# Patient Record
Sex: MALE | Race: WHITE | NOT HISPANIC OR LATINO | Employment: OTHER | URBAN - METROPOLITAN AREA
[De-identification: names, ages, dates, MRNs, and addresses within clinical notes are randomized per-mention and may not be internally consistent; named-entity substitution may affect disease eponyms.]

---

## 2019-03-12 ENCOUNTER — HOSPITAL ENCOUNTER (OUTPATIENT)
Dept: LAB | Facility: MEDICAL CENTER | Age: 54
End: 2019-03-12
Attending: NURSE PRACTITIONER
Payer: COMMERCIAL

## 2019-03-12 ENCOUNTER — OFFICE VISIT (OUTPATIENT)
Dept: MEDICAL GROUP | Facility: PHYSICIAN GROUP | Age: 54
End: 2019-03-12
Payer: COMMERCIAL

## 2019-03-12 VITALS
TEMPERATURE: 98 F | HEIGHT: 65 IN | WEIGHT: 206.8 LBS | RESPIRATION RATE: 18 BRPM | HEART RATE: 82 BPM | OXYGEN SATURATION: 95 % | DIASTOLIC BLOOD PRESSURE: 88 MMHG | SYSTOLIC BLOOD PRESSURE: 122 MMHG | BODY MASS INDEX: 34.45 KG/M2

## 2019-03-12 DIAGNOSIS — N40.1 BENIGN PROSTATIC HYPERPLASIA WITH URINARY FREQUENCY: ICD-10-CM

## 2019-03-12 DIAGNOSIS — H81.10 BENIGN PAROXYSMAL POSITIONAL VERTIGO, UNSPECIFIED LATERALITY: ICD-10-CM

## 2019-03-12 DIAGNOSIS — Z12.11 SCREENING FOR COLORECTAL CANCER: ICD-10-CM

## 2019-03-12 DIAGNOSIS — Z12.5 SCREENING FOR PROSTATE CANCER: ICD-10-CM

## 2019-03-12 DIAGNOSIS — Z00.00 ANNUAL PHYSICAL EXAM: ICD-10-CM

## 2019-03-12 DIAGNOSIS — R35.0 BENIGN PROSTATIC HYPERPLASIA WITH URINARY FREQUENCY: ICD-10-CM

## 2019-03-12 DIAGNOSIS — Z12.12 SCREENING FOR COLORECTAL CANCER: ICD-10-CM

## 2019-03-12 PROBLEM — R42 VERTIGO: Status: ACTIVE | Noted: 2019-03-12

## 2019-03-12 PROBLEM — R39.13 INTERMITTENT URINARY STREAM: Status: ACTIVE | Noted: 2019-03-12

## 2019-03-12 LAB
ALBUMIN SERPL BCP-MCNC: 4.4 G/DL (ref 3.2–4.9)
ALBUMIN/GLOB SERPL: 1.5 G/DL
ALP SERPL-CCNC: 44 U/L (ref 30–99)
ALT SERPL-CCNC: 60 U/L (ref 2–50)
ANION GAP SERPL CALC-SCNC: 8 MMOL/L (ref 0–11.9)
AST SERPL-CCNC: 28 U/L (ref 12–45)
BASOPHILS # BLD AUTO: 1 % (ref 0–1.8)
BASOPHILS # BLD: 0.09 K/UL (ref 0–0.12)
BILIRUB SERPL-MCNC: 0.7 MG/DL (ref 0.1–1.5)
BUN SERPL-MCNC: 24 MG/DL (ref 8–22)
CALCIUM SERPL-MCNC: 9.2 MG/DL (ref 8.5–10.5)
CHLORIDE SERPL-SCNC: 104 MMOL/L (ref 96–112)
CHOLEST SERPL-MCNC: 249 MG/DL (ref 100–199)
CO2 SERPL-SCNC: 28 MMOL/L (ref 20–33)
CREAT SERPL-MCNC: 0.95 MG/DL (ref 0.5–1.4)
EOSINOPHIL # BLD AUTO: 0.13 K/UL (ref 0–0.51)
EOSINOPHIL NFR BLD: 1.5 % (ref 0–6.9)
ERYTHROCYTE [DISTWIDTH] IN BLOOD BY AUTOMATED COUNT: 45.8 FL (ref 35.9–50)
EST. AVERAGE GLUCOSE BLD GHB EST-MCNC: 126 MG/DL
FASTING STATUS PATIENT QL REPORTED: NORMAL
GLOBULIN SER CALC-MCNC: 2.9 G/DL (ref 1.9–3.5)
GLUCOSE SERPL-MCNC: 98 MG/DL (ref 65–99)
HBA1C MFR BLD: 6 % (ref 0–5.6)
HCT VFR BLD AUTO: 48.5 % (ref 42–52)
HDLC SERPL-MCNC: 43 MG/DL
HGB BLD-MCNC: 15.9 G/DL (ref 14–18)
IMM GRANULOCYTES # BLD AUTO: 0.07 K/UL (ref 0–0.11)
IMM GRANULOCYTES NFR BLD AUTO: 0.8 % (ref 0–0.9)
LDLC SERPL CALC-MCNC: 132 MG/DL
LYMPHOCYTES # BLD AUTO: 2.46 K/UL (ref 1–4.8)
LYMPHOCYTES NFR BLD: 28.5 % (ref 22–41)
MCH RBC QN AUTO: 31.1 PG (ref 27–33)
MCHC RBC AUTO-ENTMCNC: 32.8 G/DL (ref 33.7–35.3)
MCV RBC AUTO: 94.9 FL (ref 81.4–97.8)
MONOCYTES # BLD AUTO: 0.68 K/UL (ref 0–0.85)
MONOCYTES NFR BLD AUTO: 7.9 % (ref 0–13.4)
NEUTROPHILS # BLD AUTO: 5.19 K/UL (ref 1.82–7.42)
NEUTROPHILS NFR BLD: 60.3 % (ref 44–72)
NRBC # BLD AUTO: 0 K/UL
NRBC BLD-RTO: 0 /100 WBC
PLATELET # BLD AUTO: 190 K/UL (ref 164–446)
PMV BLD AUTO: 11.2 FL (ref 9–12.9)
POTASSIUM SERPL-SCNC: 4 MMOL/L (ref 3.6–5.5)
PROT SERPL-MCNC: 7.3 G/DL (ref 6–8.2)
PSA SERPL-MCNC: 1.4 NG/ML (ref 0–4)
RBC # BLD AUTO: 5.11 M/UL (ref 4.7–6.1)
SODIUM SERPL-SCNC: 140 MMOL/L (ref 135–145)
TRIGL SERPL-MCNC: 371 MG/DL (ref 0–149)
TSH SERPL DL<=0.005 MIU/L-ACNC: 1.14 UIU/ML (ref 0.38–5.33)
WBC # BLD AUTO: 8.6 K/UL (ref 4.8–10.8)

## 2019-03-12 PROCEDURE — 36415 COLL VENOUS BLD VENIPUNCTURE: CPT

## 2019-03-12 PROCEDURE — 83036 HEMOGLOBIN GLYCOSYLATED A1C: CPT

## 2019-03-12 PROCEDURE — 84443 ASSAY THYROID STIM HORMONE: CPT

## 2019-03-12 PROCEDURE — 84153 ASSAY OF PSA TOTAL: CPT

## 2019-03-12 PROCEDURE — 85025 COMPLETE CBC W/AUTO DIFF WBC: CPT

## 2019-03-12 PROCEDURE — 80061 LIPID PANEL: CPT

## 2019-03-12 PROCEDURE — 80053 COMPREHEN METABOLIC PANEL: CPT

## 2019-03-12 PROCEDURE — 99204 OFFICE O/P NEW MOD 45 MIN: CPT | Performed by: NURSE PRACTITIONER

## 2019-03-12 RX ORDER — MECLIZINE HYDROCHLORIDE 25 MG/1
25 TABLET ORAL 3 TIMES DAILY PRN
Qty: 60 TAB | Refills: 0 | Status: SHIPPED | OUTPATIENT
Start: 2019-03-12 | End: 2019-06-17

## 2019-03-12 ASSESSMENT — ENCOUNTER SYMPTOMS
BLOOD IN STOOL: 0
BLURRED VISION: 0
FOCAL WEAKNESS: 0
SENSORY CHANGE: 0
ABDOMINAL PAIN: 0
SHORTNESS OF BREATH: 0
DIZZINESS: 1
CHILLS: 0
SPEECH CHANGE: 0
HEADACHES: 0
FEVER: 0

## 2019-03-12 ASSESSMENT — PATIENT HEALTH QUESTIONNAIRE - PHQ9: CLINICAL INTERPRETATION OF PHQ2 SCORE: 0

## 2019-03-12 NOTE — ASSESSMENT & PLAN NOTE
This is a new problem. Onset began over one year ago. He reports urgency, frequency, and intermittent urinary stream. He denies constipation. He denies FH of prostate cancer.

## 2019-03-12 NOTE — PROGRESS NOTES
"New Patient appointment    Shamika Downing is a 53 y.o. male here to establish care. His previous PCP was none. His presents with the following concerns:    HPI:      Vertigo  Reports new onset of vertigo that began 6 weeks ago. Symptoms include spinning sensation. He denies hearing loss or pain. He was evaluated at [facility] who prescribed PT and dramamine. Reports that he attempted maneuver at home and feels \"worse.\" He did one day of PT which he noticed no difference.    Intermittent urinary stream  This is a new problem. Onset began over one year ago. He reports urgency, frequency, and intermittent urinary stream. He denies constipation. He denies FH of prostate cancer.    Current medicines (including changes today)  Current Outpatient Prescriptions   Medication Sig Dispense Refill   • meclizine (ANTIVERT) 25 MG Tab Take 1 Tab by mouth 3 times a day as needed. 60 Tab 0     No current facility-administered medications for this visit.      He  has a past medical history of History of stroke.  He  has no past surgical history on file.  Social History   Substance Use Topics   • Smoking status: Never Smoker   • Smokeless tobacco: Never Used   • Alcohol use Yes      Comment: occasionally     Social History     Social History Narrative   • No narrative on file     Family History   Problem Relation Age of Onset   • Other Mother         Cirrhosis   • No Known Problems Father    • Cancer Sister 53        cervical   • No Known Problems Maternal Grandmother    • No Known Problems Maternal Grandfather    • No Known Problems Paternal Grandmother    • No Known Problems Paternal Grandfather    • No Known Problems Daughter    • No Known Problems Daughter      No family status information on file.         Review of Systems   Constitutional: Negative for chills, fever and malaise/fatigue.   HENT: Negative for hearing loss.    Eyes: Negative for blurred vision.   Respiratory: Negative for shortness of breath.    Cardiovascular: " "Negative for chest pain.   Gastrointestinal: Negative for abdominal pain and blood in stool.   Genitourinary: Positive for frequency and urgency. Negative for hematuria.   Neurological: Positive for dizziness. Negative for sensory change, speech change, focal weakness and headaches.       All other systems reviewed and are negative    Depression Screening    Little interest or pleasure in doing things?  0 - not at all  Feeling down, depressed , or hopeless? 0 - not at all  Patient Health Questionnaire Score: 0    If depressive symptoms identified deferred to follow up visit unless specifically addressed in assesment and plan.      Interpretation of PHQ-9 Total Score   Score Severity   1-4 Minimal Depression   5-9 Mild Depression   10-14 Moderate Depression   15-19 Moderately Severe Depression   20-27 Severe Depression       Objective:     Blood pressure 122/88, pulse 82, temperature 36.7 °C (98 °F), temperature source Temporal, resp. rate 18, height 1.651 m (5' 5\"), weight 93.8 kg (206 lb 12.8 oz), SpO2 95 %. Body mass index is 34.41 kg/m².    Physical Exam:  Constitutional: Oriented to person, place, and time and well-developed, well-nourished, and in no distress.   HENT:   Head: Normocephalic and atraumatic.   Right Ear: Tympanic membrane and external ear normal.   Left Ear: Tympanic membrane and external ear normal.   Mouth/Throat: Oropharynx is clear and moist and mucous membranes are normal. No oropharyngeal exudate or posterior oropharyngeal erythema.   Eyes: Conjunctivae and EOM are normal. Pupils are equal, round, and reactive to light.   Neck: Normal range of motion. Neck supple. No thyromegaly present.   Cardiovascular: Normal rate, regular rhythm, normal heart sounds. Radialpulses intact. Exam reveals no friction rub. No murmur heard.  Pulmonary/Chest: Effort normal and breath sounds normal. No respiratory distress or use of accessory muscles. No wheezes, rhonchi, or rales.   Abdominal: Soft. Bowel sounds " are normal. Exhibits no distension and no mass. There is no tenderness. No hepatosplenomegaly.    Musculoskeletal: Full range of motion. No deformity or swelling of joints. DTRs intact.   Lymphadenopathy:     No cervical adenopathy.   Neurological: Alert and oriented to person, place, and time. Gait normal.   Skin: Skin is warm and dry. No cyanosis. No edema.  Psychiatric: Mood, memory, affect and judgment normal.     Assessment and Plan:   The following treatment plan was discussed    1. Benign paroxysmal positional vertigo, unspecified laterality  Stable, improving. Continue PT and meclizine as prescribed. Encouraged daily water intake of at least 2 L daily. We discussed referral to ENT if symptoms do not improve.  - meclizine (ANTIVERT) 25 MG Tab; Take 1 Tab by mouth 3 times a day as needed.  Dispense: 60 Tab; Refill: 0  - REFERRAL TO PHYSICAL THERAPY Reason for Therapy: Eval/Treat/Report    2. Benign prostatic hyperplasia with urinary frequency  I suspect BPH, however I cannot r/o malignancy. PSA ordered. I did suggest OCT saw palmetto to help with symptoms.    3. Annual physical exam  Routine screening labs ordered.  - Comp Metabolic Panel; Future  - HEMOGLOBIN A1C; Future  - CBC WITH DIFFERENTIAL; Future  - Lipid Profile; Future  - TSH WITH REFLEX TO FT4; Future    4. Screening for colorectal cancer  - OCCULT BLOOD FECES IMMUNOASSAY (FIT); Future    5. Screening for prostate cancer  - PROSTATE SPECIFIC AG SCREENING; Future    Records requested.    Followup: Return if symptoms worsen or fail to improve.    I have reviewed and agree with history, assessment and plan for office encounter on 3/12/19 with Advanced Practice Provider: Irma SOL.  Face to face encounter/direct observation: No  Suggested changes to plan or follow-up: recommend she be seen by ENT   Crispin Woods M.D.          [phone]

## 2019-03-12 NOTE — ASSESSMENT & PLAN NOTE
"Reports new onset of vertigo that began 6 weeks ago. Symptoms include spinning sensation. He denies hearing loss or pain. He was evaluated at [facility] who prescribed PT and dramamine. Reports that he attempted maneuver at home and feels \"worse.\" He did one day of PT which he noticed no difference.  "

## 2019-03-13 ENCOUNTER — SUPERVISING PHYSICIAN REVIEW (OUTPATIENT)
Dept: MEDICAL GROUP | Facility: PHYSICIAN GROUP | Age: 54
End: 2019-03-13

## 2019-03-13 ENCOUNTER — HOSPITAL ENCOUNTER (OUTPATIENT)
Facility: MEDICAL CENTER | Age: 54
End: 2019-03-13
Attending: NURSE PRACTITIONER
Payer: COMMERCIAL

## 2019-03-13 ENCOUNTER — TELEPHONE (OUTPATIENT)
Dept: MEDICAL GROUP | Facility: PHYSICIAN GROUP | Age: 54
End: 2019-03-13

## 2019-03-13 PROCEDURE — 82274 ASSAY TEST FOR BLOOD FECAL: CPT

## 2019-03-13 NOTE — TELEPHONE ENCOUNTER
----- Message from VINICIO Teresa sent at 3/13/2019 10:18 AM PDT -----  Please advise patient I have reviewed their recent lab results. There are some results that need further evaluation. Please have patient schedule an office visit. Thank you!    VINICIO Teresa,SYL

## 2019-03-20 LAB — HEMOCCULT STL QL IA: NEGATIVE

## 2019-04-08 ENCOUNTER — OFFICE VISIT (OUTPATIENT)
Dept: MEDICAL GROUP | Facility: PHYSICIAN GROUP | Age: 54
End: 2019-04-08
Payer: COMMERCIAL

## 2019-04-08 VITALS
BODY MASS INDEX: 34.77 KG/M2 | DIASTOLIC BLOOD PRESSURE: 78 MMHG | TEMPERATURE: 97.1 F | HEART RATE: 87 BPM | RESPIRATION RATE: 19 BRPM | HEIGHT: 65 IN | SYSTOLIC BLOOD PRESSURE: 120 MMHG | WEIGHT: 208.7 LBS | OXYGEN SATURATION: 94 %

## 2019-04-08 DIAGNOSIS — R73.03 PREDIABETES: ICD-10-CM

## 2019-04-08 DIAGNOSIS — E78.2 MIXED HYPERLIPIDEMIA: ICD-10-CM

## 2019-04-08 DIAGNOSIS — R42 VERTIGO: ICD-10-CM

## 2019-04-08 PROBLEM — E78.5 HYPERLIPIDEMIA: Status: ACTIVE | Noted: 2019-04-08

## 2019-04-08 PROCEDURE — 99214 OFFICE O/P EST MOD 30 MIN: CPT | Performed by: NURSE PRACTITIONER

## 2019-04-08 RX ORDER — SIMVASTATIN 40 MG
40 TABLET ORAL EVERY EVENING
Qty: 90 TAB | Refills: 0 | Status: SHIPPED | OUTPATIENT
Start: 2019-04-08 | End: 2019-10-03 | Stop reason: SDUPTHER

## 2019-04-08 NOTE — ASSESSMENT & PLAN NOTE
Lab results show elevated total cholesterol, triglycerides, and LDL. HA1C was also elevated at 6.0. He does admit to sedentary lifestyle and overall unhealthy diet. He admits to consuming red meats, nicholson, and cheese on a regular basis. He does have hx of stroke. He is a nonsmoker.     Results for GREG RUIZ (MRN [id number]) as of 4/9/2019 12:46   Ref. Range 3/12/2019 11:31   Cholesterol,Tot Latest Ref Range: 100 - 199 mg/dL 249 (H)   Triglycerides Latest Ref Range: 0 - 149 mg/dL 371 (H)   HDL Latest Ref Range: >=40 mg/dL 43   LDL Latest Ref Range: <100 mg/dL 132 (H)

## 2019-04-08 NOTE — PROGRESS NOTES
"Subjective:   Shamika Ruiz is a 53 y.o. male here today for follow up on labs and vertigo.    Hyperlipidemia  Lab results show elevated total cholesterol, triglycerides, and LDL. HA1C was also elevated at 6.0. He does admit to sedentary lifestyle and overall unhealthy diet. He admits to consuming red meats, nicholson, and cheese on a regular basis. He does have hx of stroke. He is a nonsmoker.     Results for SHAMIKA RUIZ (MRN [id number]) as of 4/9/2019 12:46   Ref. Range 3/12/2019 11:31   Cholesterol,Tot Latest Ref Range: 100 - 199 mg/dL 249 (H)   Triglycerides Latest Ref Range: 0 - 149 mg/dL 371 (H)   HDL Latest Ref Range: >=40 mg/dL 43   LDL Latest Ref Range: <100 mg/dL 132 (H)         Vertigo  Onset began over 6 weeks ago. He was referred to vestibular PT and prescribed meclizine, however his symptoms have not improved. He continues to experiencing \"spinning sensation.\" He denies hearing loss or ear pain.       Current medicines (including changes today)  Current Outpatient Prescriptions   Medication Sig Dispense Refill   • simvastatin (ZOCOR) 40 MG Tab Take 1 Tab by mouth every evening. 90 Tab 0   • meclizine (ANTIVERT) 25 MG Tab Take 1 Tab by mouth 3 times a day as needed. 60 Tab 0     No current facility-administered medications for this visit.      He  has a past medical history of History of stroke.    Social History     Social History   • Marital status:      Spouse name: N/A   • Number of children: N/A   • Years of education: N/A     Occupational History   • Not on file.     Social History Main Topics   • Smoking status: Never Smoker   • Smokeless tobacco: Never Used   • Alcohol use Yes      Comment: occasionally   • Drug use: No   • Sexual activity: Yes     Partners: Female      Comment:      Other Topics Concern   • Not on file     Social History Narrative   • No narrative on file       Review of Systems   Constitutional: Negative for chills, fever and malaise/fatigue.   HENT: Negative for ear " "pain, hearing loss and tinnitus.    Respiratory: Negative for shortness of breath.    Cardiovascular: Negative for chest pain and palpitations.   Neurological: Positive for dizziness. Negative for headaches.        Objective:     /78   Pulse 87   Temp 36.2 °C (97.1 °F) (Temporal)   Resp 19   Ht 1.651 m (5' 5\")   Wt 94.7 kg (208 lb 11.2 oz)   SpO2 94%  Body mass index is 34.73 kg/m².     Physical Exam:  Constitutional: Oriented to person, place, and time and well-developed, well-nourished, and in no distress.   HENT:   Head: Normocephalic and atraumatic.   Eyes: Conjunctivae and EOM are normal. Pupils are equal, round, and reactive to light.   Neck: Normal range of motion. Neck supple.  Cardiovascular: Normal rate, regular rhythm, normal heart sounds.  Exam reveals no friction rub. No murmur heard.  Pulmonary/Chest: Effort normal and breath sounds normal. No respiratory distress or use of accessory muscles. No wheezes, rhonchi, or rales.   Neurological: Alert and oriented to person, place, and time. Gait normal.   Skin: Skin is warm and dry. No cyanosis. No edema.  Psychiatric: Mood, memory, affect and judgment normal.       Assessment and Plan:   The following treatment plan was discussed    1. Mixed hyperlipidemia  Uncontrolled. Due to elevated cholesterol levels and previous history of stroke, initiate treatment with simvastatin. Strongly encourage lifestyle modifications with low saturated fat and high fiber diet. I also recommend OTC Co-Q 10 supplement of 100-200 mg daily to prevent side effects of statin therapy. Will recheck lipid panel and LFTs in 3 months.  - simvastatin (ZOCOR) 40 MG Tab; Take 1 Tab by mouth every evening.  Dispense: 90 Tab; Refill: 0  - HEMOGLOBIN A1C; Future  - Comp Metabolic Panel; Future  - Lipid Profile; Future    2. Vertigo  Uncontrolled. He was referred to ENT for further evaluation.  - REFERRAL TO ENT    3. Prediabetes  Strongly encourage lifestyle changes with low " intake of refined carbohydrates, along with daily physical activity.  - HEMOGLOBIN A1C; Future      Followup: Return in about 3 months (around 7/8/2019) for For follow-up on hyperlipidemia.

## 2019-04-09 PROBLEM — R73.03 PREDIABETES: Status: ACTIVE | Noted: 2019-04-09

## 2019-04-09 ASSESSMENT — ENCOUNTER SYMPTOMS
FEVER: 0
SHORTNESS OF BREATH: 0
HEADACHES: 0
DIZZINESS: 1
PALPITATIONS: 0
CHILLS: 0

## 2019-04-09 NOTE — ASSESSMENT & PLAN NOTE
"Onset began over 6 weeks ago. He was referred to vestibular PT and prescribed meclizine, however his symptoms have not improved. He continues to experiencing \"spinning sensation.\" He denies hearing loss or ear pain.  "

## 2019-06-17 ENCOUNTER — OFFICE VISIT (OUTPATIENT)
Dept: MEDICAL GROUP | Facility: PHYSICIAN GROUP | Age: 54
End: 2019-06-17
Payer: COMMERCIAL

## 2019-06-17 VITALS
SYSTOLIC BLOOD PRESSURE: 122 MMHG | RESPIRATION RATE: 18 BRPM | OXYGEN SATURATION: 95 % | TEMPERATURE: 97.6 F | DIASTOLIC BLOOD PRESSURE: 82 MMHG | HEIGHT: 65 IN | HEART RATE: 100 BPM | BODY MASS INDEX: 34.42 KG/M2 | WEIGHT: 206.57 LBS

## 2019-06-17 DIAGNOSIS — L24.89 IRRITANT CONTACT DERMATITIS DUE TO OTHER AGENTS: ICD-10-CM

## 2019-06-17 DIAGNOSIS — D22.9 ATYPICAL NEVUS: ICD-10-CM
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
## 2019-06-17 PROBLEM — L98.9 SKIN LESION: Status: ACTIVE | Noted: 2019-06-17

## 2019-06-17 PROCEDURE — 99214 OFFICE O/P EST MOD 30 MIN: CPT | Performed by: NURSE PRACTITIONER

## 2019-06-17 RX ORDER — HYDROXYZINE 50 MG/1
50 TABLET, FILM COATED ORAL 3 TIMES DAILY PRN
Qty: 30 TAB | Refills: 0 | Status: SHIPPED | OUTPATIENT
Start: 2019-06-17 | End: 2022-04-06

## 2019-06-17 ASSESSMENT — ENCOUNTER SYMPTOMS
CHILLS: 0
FEVER: 0

## 2019-06-17 NOTE — PROGRESS NOTES
"Subjective:   Shamika Downing is a 53 y.o. male here today for c/o skin lesion.    No problem-specific Assessment & Plan notes found for this encounter.     Rash       Current medicines (including changes today)  Current Outpatient Prescriptions   Medication Sig Dispense Refill   • simvastatin (ZOCOR) 40 MG Tab Take 1 Tab by mouth every evening. 90 Tab 0   • meclizine (ANTIVERT) 25 MG Tab Take 1 Tab by mouth 3 times a day as needed. 60 Tab 0     No current facility-administered medications for this visit.      He  has a past medical history of History of stroke.    Social History     Social History   • Marital status:      Spouse name: N/A   • Number of children: N/A   • Years of education: N/A     Occupational History   • Not on file.     Social History Main Topics   • Smoking status: Never Smoker   • Smokeless tobacco: Never Used   • Alcohol use Yes      Comment: occasionally   • Drug use: No   • Sexual activity: Yes     Partners: Female      Comment:      Other Topics Concern   • Not on file     Social History Narrative   • No narrative on file       Review of Systems   Skin: Positive for rash.          Objective:     /82   Pulse 100   Temp 36.4 °C (97.6 °F) (Temporal)   Resp 18   Ht 1.651 m (5' 5\")   Wt 93.7 kg (206 lb 9.1 oz)   SpO2 95%  Body mass index is 34.38 kg/m². ***    Physical Exam:  Constitutional: Oriented to person, place, and time and well-developed, well-nourished, and in no distress.   HENT:   Head: Normocephalic and atraumatic.   Right Ear: Tympanic membrane and external ear normal.   Left Ear: Tympanic membrane and external ear normal.   Mouth/Throat: Oropharynx is clear and moist and mucous membranes are normal. No oropharyngeal exudate or posterior oropharyngeal erythema.   Eyes: Conjunctivae and EOM are normal. Pupils are equal, round, and reactive to light.   Neck: Normal range of motion. Neck supple. No thyromegaly present.   Cardiovascular: Normal rate, regular " rhythm, normal heart sounds. Radial and pedal pulses intact. Exam reveals no friction rub. No murmur heard.  Pulmonary/Chest: Effort normal and breath sounds normal. No respiratory distress or use of accessory muscles. No wheezes, rhonchi, or rales.   Abdominal: Soft. Bowel sounds are normal. Exhibits no distension and no mass. There is no tenderness. No hepatosplenomegaly.    Musculoskeletal: Full range of motion. No deformity or swelling of joints.   Lymphadenopathy:     No cervical adenopathy.   Neurological: Alert and oriented to person, place, and time. Gait normal. CN I- CN XII intact. Coordination of rapid alternating movements. Cerebellar function intact. DTR +2, symmetrical.   Skin: Skin is warm and dry. No cyanosis. No edema.  Psychiatric: Mood, memory, affect and judgment normal.   ***      Assessment and Plan:   The following treatment plan was discussed    There are no diagnoses linked to this encounter.    Followup: No Follow-up on file.

## 2019-06-17 NOTE — ASSESSMENT & PLAN NOTE
New onset of skin lesion present for past 2 months. Lesion has grown in size and tender to touch. He admits to not typically wearing sunscreen with sun exposure, however he does wear long sleeves and hat while fishing. Reports FH of skin cancer (paternal uncle).

## 2019-06-17 NOTE — PROGRESS NOTES
"Subjective:   Shamika Downing is a 53 y.o. male here today for c/o rash and skin lesion.    Skin lesion  New onset of skin lesion present for past 2 months. Lesion has grown in size and tender to touch. He admits to not typically wearing sunscreen with sun exposure, however he does wear long sleeves and hat while fishing. Reports FH of skin cancer (paternal uncle).        Rash   This is a recurrent problem. The problem has been waxing and waning since onset. The affected locations include the left lower leg and right lower leg. The rash is characterized by itchiness. He was exposed to an insect bite/sting (Mosquito bites). Pertinent negatives include no fever. Past treatments include antihistamine and anti-itch cream. The treatment provided mild relief.       Current medicines (including changes today)  Current Outpatient Prescriptions   Medication Sig Dispense Refill   • hydrOXYzine HCl (ATARAX) 50 MG Tab Take 1 Tab by mouth 3 times a day as needed for Itching. 30 Tab 0   • simvastatin (ZOCOR) 40 MG Tab Take 1 Tab by mouth every evening. 90 Tab 0     No current facility-administered medications for this visit.      He  has a past medical history of History of stroke.    Social History     Social History   • Marital status:      Spouse name: N/A   • Number of children: N/A   • Years of education: N/A     Occupational History   • Not on file.     Social History Main Topics   • Smoking status: Never Smoker   • Smokeless tobacco: Never Used   • Alcohol use Yes      Comment: occasionally   • Drug use: No   • Sexual activity: Yes     Partners: Female      Comment:      Other Topics Concern   • Not on file     Social History Narrative   • No narrative on file       Review of Systems   Constitutional: Negative for chills and fever.   Skin: Positive for itching and rash.          Objective:     /82   Pulse 100   Temp 36.4 °C (97.6 °F) (Temporal)   Resp 18   Ht 1.651 m (5' 5\")   Wt 93.7 kg (206 lb 9.1 " oz)   SpO2 95%  Body mass index is 34.38 kg/m².     Physical Exam:  Constitutional: Oriented to person, place, and time and well-developed, well-nourished, and in no distress.   HENT:   Head: Normocephalic and atraumatic.   Eyes: Conjunctivae and EOM are normal. Pupils are equal, round, and reactive to light.   Neck: Normal range of motion. Neck supple.  Cardiovascular: Normal rate, regular rhythm, normal heart sounds.Exam reveals no friction rub. No murmur heard.  Pulmonary/Chest: Effort normal and breath sounds normal. No respiratory distress or use of accessory muscles. No wheezes, rhonchi, or rales.   Neurological: Alert and oriented to person, place, and time. Gait normal.   Skin: Skin is warm and dry. No cyanosis. Approx 0.3 cm nevus located right forehead, flesh colored. Multiple healing lesions that appear as insect bites to BLE.  Psychiatric: Mood, memory, affect and judgment normal.       Assessment and Plan:   The following treatment plan was discussed    1. Atypical nevus  He was referred to dermatology for further evaluation.  - REFERRAL TO DERMATOLOGY    2. Irritant contact dermatitis due to other agents  I recommend use of insect repellant and protective clothing to prevent further insect exposure. I suggested natural skin repellant with peppermint, eucalyptus, and lemon essential oils. He was prescribed hydroxyzine prn for itching. Advised to also apply calamine lotion or benadryl cream to lesions.  - hydrOXYzine HCl (ATARAX) 50 MG Tab; Take 1 Tab by mouth 3 times a day as needed for Itching.  Dispense: 30 Tab; Refill: 0      Followup: Return if symptoms worsen or fail to improve.

## 2019-07-22 DIAGNOSIS — G47.33 OSA (OBSTRUCTIVE SLEEP APNEA): ICD-10-CM

## 2019-10-03 DIAGNOSIS — E78.2 MIXED HYPERLIPIDEMIA: ICD-10-CM

## 2019-10-03 RX ORDER — SIMVASTATIN 40 MG
40 TABLET ORAL EVERY EVENING
Qty: 90 TAB | Refills: 0 | Status: SHIPPED | OUTPATIENT
Start: 2019-10-03 | End: 2019-10-07 | Stop reason: SDUPTHER

## 2019-10-07 DIAGNOSIS — E78.2 MIXED HYPERLIPIDEMIA: ICD-10-CM

## 2019-10-07 RX ORDER — SIMVASTATIN 40 MG
40 TABLET ORAL EVERY EVENING
Qty: 90 TAB | Refills: 0 | Status: SHIPPED | OUTPATIENT
Start: 2019-10-07 | End: 2020-08-03 | Stop reason: SDUPTHER

## 2019-12-04 ENCOUNTER — OFFICE VISIT (OUTPATIENT)
Dept: MEDICAL GROUP | Facility: PHYSICIAN GROUP | Age: 54
End: 2019-12-04
Payer: COMMERCIAL

## 2019-12-04 VITALS
TEMPERATURE: 98.3 F | OXYGEN SATURATION: 98 % | HEIGHT: 65 IN | SYSTOLIC BLOOD PRESSURE: 120 MMHG | WEIGHT: 206 LBS | BODY MASS INDEX: 34.32 KG/M2 | HEART RATE: 95 BPM | RESPIRATION RATE: 20 BRPM | DIASTOLIC BLOOD PRESSURE: 90 MMHG

## 2019-12-04 DIAGNOSIS — L30.9 DERMATITIS: ICD-10-CM

## 2019-12-04 PROBLEM — L98.9 SKIN LESION: Status: RESOLVED | Noted: 2019-06-17 | Resolved: 2019-12-04

## 2019-12-04 PROCEDURE — 99214 OFFICE O/P EST MOD 30 MIN: CPT | Performed by: NURSE PRACTITIONER

## 2019-12-04 RX ORDER — TRIAMCINOLONE ACETONIDE 1 MG/G
CREAM TOPICAL
Qty: 1 TUBE | Refills: 1 | Status: SHIPPED | OUTPATIENT
Start: 2019-12-04 | End: 2019-12-27 | Stop reason: SDUPTHER

## 2019-12-04 ASSESSMENT — ENCOUNTER SYMPTOMS
FEVER: 0
CHILLS: 0

## 2019-12-04 NOTE — PROGRESS NOTES
Subjective:   Shamika Downing is a 54 y.o. male here today for concerns of rash.    Rash   This is a new problem. Episode onset: August of this year. The problem is unchanged. The affected locations include the left ankle. The rash is characterized by itchiness, scaling and redness. He was exposed to nothing. Pertinent negatives include no fever or joint pain. Past treatments include moisturizer. The treatment provided no relief. There is no history of allergies or eczema.     Current medicines (including changes today)  Current Outpatient Medications   Medication Sig Dispense Refill   • triamcinolone acetonide (KENALOG) 0.1 % Cream Apply pea size amount to affected area twice daily for 7 days. May repeat if symptoms do not resolved. 1 Tube 1   • simvastatin (ZOCOR) 40 MG Tab Take 1 Tab by mouth every evening. 90 Tab 0   • hydrOXYzine HCl (ATARAX) 50 MG Tab Take 1 Tab by mouth 3 times a day as needed for Itching. (Patient not taking: Reported on 12/4/2019) 30 Tab 0     No current facility-administered medications for this visit.      He  has a past medical history of History of stroke.    Social History     Socioeconomic History   • Marital status:      Spouse name: Not on file   • Number of children: Not on file   • Years of education: Not on file   • Highest education level: Not on file   Occupational History   • Not on file   Social Needs   • Financial resource strain: Not on file   • Food insecurity:     Worry: Not on file     Inability: Not on file   • Transportation needs:     Medical: Not on file     Non-medical: Not on file   Tobacco Use   • Smoking status: Never Smoker   • Smokeless tobacco: Never Used   Substance and Sexual Activity   • Alcohol use: Yes     Comment: occasionally   • Drug use: No   • Sexual activity: Yes     Partners: Female     Comment:    Lifestyle   • Physical activity:     Days per week: Not on file     Minutes per session: Not on file   • Stress: Not on file   Relationships  "  • Social connections:     Talks on phone: Not on file     Gets together: Not on file     Attends Hinduism service: Not on file     Active member of club or organization: Not on file     Attends meetings of clubs or organizations: Not on file     Relationship status: Not on file   • Intimate partner violence:     Fear of current or ex partner: Not on file     Emotionally abused: Not on file     Physically abused: Not on file     Forced sexual activity: Not on file   Other Topics Concern   • Not on file   Social History Narrative   • Not on file       Review of Systems   Constitutional: Negative for chills, fever and malaise/fatigue.   Musculoskeletal: Negative for joint pain.   Skin: Positive for itching and rash.        Objective:     /90   Pulse 95   Temp 36.8 °C (98.3 °F) (Temporal)   Resp 20   Ht 1.651 m (5' 5\")   Wt 93.4 kg (206 lb)   SpO2 98%  Body mass index is 34.28 kg/m².     Physical Exam:  Constitutional: Oriented to person, place, and time and well-developed, well-nourished, and in no distress.   HENT:   Head: Normocephalic and atraumatic.   Eyes: Conjunctivae and EOM are normal. Pupils are equal, round, and reactive to light.   Neck: Normal range of motion. Neck supple.  Cardiovascular: Normal rate, regular rhythm, normal heart sounds. Exam reveals no friction rub. No murmur heard.  Pulmonary/Chest: Effort normal and breath sounds normal. No respiratory distress or use of accessory muscles. No wheezes, rhonchi, or rales.   Neurological: Alert and oriented to person, place, and time.   Skin: Skin is warm and dry. No cyanosis. No edema.  Physical Exam   Skin:        Erythematous plaque located L lateral ankle. No drainage or vesicles noted.     Psychiatric: Mood, memory, affect and judgment normal.       Assessment and Plan:   The following treatment plan was discussed    1. Dermatitis  I suspect eczema, however I cannot r/o psoriasis or other skin etiology. Will treat with topical steroid. " Advised to apply emollient cream such as aquaphor or coconut oil daily. He was referred to dermatology for further evaluation.  - REFERRAL TO DERMATOLOGY  - triamcinolone acetonide (KENALOG) 0.1 % Cream; Apply pea size amount to affected area twice daily for 7 days. May repeat if symptoms do not resolved.  Dispense: 1 Tube; Refill: 1      Followup: Return if symptoms worsen or fail to improve.

## 2020-08-03 DIAGNOSIS — E78.2 MIXED HYPERLIPIDEMIA: ICD-10-CM

## 2020-08-04 RX ORDER — SIMVASTATIN 40 MG
40 TABLET ORAL EVERY EVENING
Qty: 90 TAB | Refills: 0 | Status: SHIPPED | OUTPATIENT
Start: 2020-08-04 | End: 2022-04-06 | Stop reason: SDUPTHER

## 2022-04-06 ENCOUNTER — NON-PROVIDER VISIT (OUTPATIENT)
Dept: URGENT CARE | Facility: CLINIC | Age: 57
End: 2022-04-06
Payer: COMMERCIAL

## 2022-04-06 ENCOUNTER — OFFICE VISIT (OUTPATIENT)
Dept: MEDICAL GROUP | Facility: PHYSICIAN GROUP | Age: 57
End: 2022-04-06
Payer: COMMERCIAL

## 2022-04-06 ENCOUNTER — APPOINTMENT (OUTPATIENT)
Dept: RADIOLOGY | Facility: IMAGING CENTER | Age: 57
End: 2022-04-06
Attending: STUDENT IN AN ORGANIZED HEALTH CARE EDUCATION/TRAINING PROGRAM
Payer: COMMERCIAL

## 2022-04-06 VITALS
WEIGHT: 217.15 LBS | HEART RATE: 100 BPM | SYSTOLIC BLOOD PRESSURE: 126 MMHG | TEMPERATURE: 97.5 F | OXYGEN SATURATION: 93 % | RESPIRATION RATE: 12 BRPM | BODY MASS INDEX: 34.9 KG/M2 | HEIGHT: 66 IN | DIASTOLIC BLOOD PRESSURE: 70 MMHG

## 2022-04-06 DIAGNOSIS — M25.512 CHRONIC PAIN OF BOTH SHOULDERS: ICD-10-CM

## 2022-04-06 DIAGNOSIS — G89.29 CHRONIC PAIN OF BOTH SHOULDERS: ICD-10-CM

## 2022-04-06 DIAGNOSIS — E66.9 OBESITY (BMI 30-39.9): ICD-10-CM

## 2022-04-06 DIAGNOSIS — R39.13 SPLITTING OF URINARY STREAM: ICD-10-CM

## 2022-04-06 DIAGNOSIS — M25.511 CHRONIC PAIN OF BOTH SHOULDERS: ICD-10-CM

## 2022-04-06 DIAGNOSIS — R73.03 PREDIABETES: ICD-10-CM

## 2022-04-06 DIAGNOSIS — E78.2 MIXED HYPERLIPIDEMIA: ICD-10-CM

## 2022-04-06 DIAGNOSIS — Z00.00 HEALTHCARE MAINTENANCE: ICD-10-CM

## 2022-04-06 DIAGNOSIS — Z23 NEED FOR VACCINATION: ICD-10-CM

## 2022-04-06 PROCEDURE — 90471 IMMUNIZATION ADMIN: CPT | Performed by: STUDENT IN AN ORGANIZED HEALTH CARE EDUCATION/TRAINING PROGRAM

## 2022-04-06 PROCEDURE — 90715 TDAP VACCINE 7 YRS/> IM: CPT | Performed by: STUDENT IN AN ORGANIZED HEALTH CARE EDUCATION/TRAINING PROGRAM

## 2022-04-06 PROCEDURE — 99214 OFFICE O/P EST MOD 30 MIN: CPT | Mod: 25 | Performed by: STUDENT IN AN ORGANIZED HEALTH CARE EDUCATION/TRAINING PROGRAM

## 2022-04-06 PROCEDURE — 73030 X-RAY EXAM OF SHOULDER: CPT | Mod: TC,LT | Performed by: RADIOLOGY

## 2022-04-06 PROCEDURE — 73030 X-RAY EXAM OF SHOULDER: CPT | Mod: TC,RT | Performed by: STUDENT IN AN ORGANIZED HEALTH CARE EDUCATION/TRAINING PROGRAM

## 2022-04-06 RX ORDER — SIMVASTATIN 40 MG
40 TABLET ORAL EVERY EVENING
Qty: 90 TABLET | Refills: 3 | Status: SHIPPED | OUTPATIENT
Start: 2022-04-06 | End: 2022-04-06

## 2022-04-06 RX ORDER — SIMVASTATIN 40 MG
40 TABLET ORAL EVERY EVENING
Qty: 90 EACH | Refills: 3 | Status: SHIPPED | OUTPATIENT
Start: 2022-04-06 | End: 2023-03-09

## 2022-04-06 ASSESSMENT — ENCOUNTER SYMPTOMS
SPUTUM PRODUCTION: 0
SHORTNESS OF BREATH: 0
DIZZINESS: 0
FEVER: 0
HEADACHES: 0
WHEEZING: 0
COUGH: 0

## 2022-04-06 ASSESSMENT — PATIENT HEALTH QUESTIONNAIRE - PHQ9: CLINICAL INTERPRETATION OF PHQ2 SCORE: 0

## 2022-04-06 NOTE — ASSESSMENT & PLAN NOTE
On exam today he is positive for a provocative test of the rotator cuff muscles as well as positive Ransom's test concerning for labrum pathology.    Plan:  X-rays, physical therapy  Return to care in 1 month if not improving consider MRI of both shoulders or steroid injections    This is a new problem for me, 2 x-rays ordered, physical therapy

## 2022-04-06 NOTE — PROGRESS NOTES
Subjective:   HISTORY OF THE PRESENT ILLNESS: Patient is a 56 y.o. male here today to establish care and discuss shoulder pain bilaterally.    Problem   Shoulder Pain, Bilateral    Patient reports he fell down some stairs about 6 months ago and since then has been having progressively worsening shoulder pain and limited range of motion.  The pain is worse at night he feels it inside the joints.  He is noticed he can cross his arms or reach around to his back as he normally could.  He denies any fevers, chills, numbness and tingling down his arms.  Denies any neck pain     Obesity (Bmi 30-39.9)   Healthcare Maintenance   Prediabetes   Hyperlipidemia    Patient ran out of his simvastatin and needs refills.  His last lipid panel was done about 3 years ago.  He denies any chest pain, dyspnea on exertion, edema.     Intermittent urinary stream    Patient complaining of chronic weak urinary stream and increased frequency and urgency of urination.  He denies any significant nocturia and wakes up about once a night to urinate.  His last PSA was done as 19 within normal limits.        Current Outpatient Medications Ordered in Epic   Medication Sig Dispense Refill   • simvastatin (ZOCOR) 40 MG Tab Take 1 Tablet by mouth every evening. 90 Each 3     No current Epic-ordered facility-administered medications on file.       Past Medical History:   Diagnosis Date   • History of stroke     2003     No past surgical history on file.  Social History     Tobacco Use   • Smoking status: Never Smoker   • Smokeless tobacco: Never Used   Vaping Use   • Vaping Use: Never used   Substance Use Topics   • Alcohol use: Yes     Comment: occasionally   • Drug use: No      Family History   Problem Relation Age of Onset   • Other Mother         Cirrhosis   • No Known Problems Father    • Cancer Sister 53        cervical   • No Known Problems Maternal Grandmother    • No Known Problems Maternal Grandfather    • No Known Problems Paternal  "Grandmother    • No Known Problems Paternal Grandfather    • No Known Problems Daughter    • No Known Problems Daughter      Current Outpatient Medications   Medication Sig Dispense Refill   • simvastatin (ZOCOR) 40 MG Tab Take 1 Tablet by mouth every evening. 90 Each 3     No current facility-administered medications for this visit.       Health Maintenance: Completed    Review of Systems   Constitutional: Negative for fever.   Respiratory: Negative for cough, sputum production, shortness of breath and wheezing.    Cardiovascular: Negative for chest pain.   Neurological: Negative for dizziness and headaches.          Objective:     Exam: /70   Pulse 100   Temp 36.4 °C (97.5 °F) (Temporal)   Resp 12   Ht 1.676 m (5' 6\")   Wt 98.5 kg (217 lb 2.5 oz)   SpO2 93%  Body mass index is 35.05 kg/m².    Physical Exam  Vitals reviewed.   Constitutional:       General: He is not in acute distress.     Appearance: Normal appearance. He is obese. He is not ill-appearing or diaphoretic.   HENT:      Head: Normocephalic and atraumatic.   Eyes:      General: No scleral icterus.        Right eye: No discharge.         Left eye: No discharge.      Conjunctiva/sclera: Conjunctivae normal.   Cardiovascular:      Pulses: Normal pulses.      Heart sounds: Normal heart sounds. No murmur heard.    No gallop.   Pulmonary:      Effort: Pulmonary effort is normal. No respiratory distress.      Breath sounds: Normal breath sounds. No wheezing.   Abdominal:      General: Abdomen is flat. Bowel sounds are normal.      Palpations: Abdomen is soft.   Musculoskeletal:      Right lower leg: No edema.      Left lower leg: No edema.   Neurological:      General: No focal deficit present.      Mental Status: He is alert.   Psychiatric:         Mood and Affect: Mood normal.         Thought Content: Thought content normal.       Inspection:  Normal and symmetric appearance bilaterally    Palpation:  No AC joint tenderness, no tenderness at " area long head of biceps tendon right, left    right Shoulder:    ROM: Decreased range of motion in internal rotation, external rotation, flexion up to 180 degrees.  Should abduction: Within normal limits, with pain.  Active external shoulder rotation: Within normal limits with pain    negative Velazquez test for impingement  negative Neer test for impingement    negative Empty can test for supraspinatus rotator cuff integrity  positive Internal rotation lag test for subscapularis rotator cuff integrity  Negative external rotation lag test for infraspinatus tear  positive External rotation against resistance for infraspinatus rotator cuff integrity  positive O'Briens test (cross arm) for SLAP lesion    Left shoulder  Decreased range of motion inspiration, external rotation, flexion of 20 degrees.  Negative Velazquez, negative Neer test  Negative empty can test  Negative external rotation lag test, positive external rotation against resistance        Assessment & Plan:   56 y.o. male with the following -    Problem List Items Addressed This Visit     Shoulder pain, bilateral (Chronic)     On exam today he is positive for a provocative test of the rotator cuff muscles as well as positive [place]'s test concerning for labrum pathology.    Plan:  X-rays, physical therapy  Return to care in 1 month if not improving consider MRI of both shoulders or steroid injections    This is a new problem for me, 2 x-rays ordered, physical therapy         Relevant Orders    DX-SHOULDER 2+ LEFT    DX-SHOULDER 2+ RIGHT    Referral to Physical Therapy    Intermittent urinary stream     PSA ordered, evaluation at next visit in 1 month.  Consider prostate exam.  Patient reports he does have a family history of prostate cancer         Hyperlipidemia     Refill simvastatin 40 mg daily.  Lipid panel ordered follow-up 1 month.         Relevant Medications    simvastatin (ZOCOR) 40 MG Tab    Other Relevant Orders    Lipid Profile    Prediabetes      Hemoglobin A1c ordered         Obesity (BMI 30-39.9)    Relevant Orders    HEMOGLOBIN A1C    Healthcare maintenance     Patient here for a preventive medicine visit today and to establish care.  -Reviewed all past medical history, family history, social history    -Diet and exercise appropriate counseling given  -Social determinants of health reviewed  -Tobacco, alcohol, recreational drug use: Reviewed no concerns  -Occupation: Online  -Cholesterol screening: Ordered  -Diabetes screening: Ordered  -AAA Screening: Not indicated   -Blood pressure: 126/70    Immunizations  STI screening: Declined  HIV screening: Declined  Immunizations: Tdap today.  Advised on shingles vaccine at pharmacy.    Cancer screenings:  Colorectal cancer screening: Reports he just had a colonoscopy, reportedly normal.  Will request records.  Prostate Cancer Screening/PSA: Ordered           Relevant Orders    CBC WITHOUT DIFFERENTIAL    Comp Metabolic Panel    PROSTATE SPECIFIC AG DIAGNOSTIC      Other Visit Diagnoses     Need for vaccination        Relevant Orders    Tdap Vaccine =>6YO IM (Completed)           Return in about 1 month (around 5/6/2022) for shoulders, urinary symptoms.    Please note that this dictation was created using voice recognition software. I have made every reasonable attempt to correct obvious errors, but I expect that there are errors of grammar and possibly content that I did not discover before finalizing the note.

## 2022-04-06 NOTE — ASSESSMENT & PLAN NOTE
Patient here for a preventive medicine visit today and to establish care.  -Reviewed all past medical history, family history, social history    -Diet and exercise appropriate counseling given  -Social determinants of health reviewed  -Tobacco, alcohol, recreational drug use: Reviewed no concerns  -Occupation: Online  -Cholesterol screening: Ordered  -Diabetes screening: Ordered  -AAA Screening: Not indicated   -Blood pressure: 126/70    Immunizations  STI screening: Declined  HIV screening: Declined  Immunizations: Tdap today.  Advised on shingles vaccine at pharmacy.    Cancer screenings:  Colorectal cancer screening: Reports he just had a colonoscopy, reportedly normal.  Will request records.  Prostate Cancer Screening/PSA: Ordered

## 2022-04-06 NOTE — ASSESSMENT & PLAN NOTE
PSA ordered, evaluation at next visit in 1 month.  Consider prostate exam.  Patient reports he does have a family history of prostate cancer

## 2023-03-08 DIAGNOSIS — E78.2 MIXED HYPERLIPIDEMIA: ICD-10-CM

## 2023-03-09 RX ORDER — SIMVASTATIN 40 MG
TABLET ORAL
Qty: 90 TABLET | Refills: 3 | Status: SHIPPED | OUTPATIENT
Start: 2023-03-09 | End: 2024-03-05

## 2024-01-17 ENCOUNTER — OFFICE VISIT (OUTPATIENT)
Dept: URGENT CARE | Facility: CLINIC | Age: 59
End: 2024-01-17
Payer: COMMERCIAL

## 2024-01-17 VITALS
DIASTOLIC BLOOD PRESSURE: 70 MMHG | RESPIRATION RATE: 18 BRPM | HEIGHT: 66 IN | TEMPERATURE: 97 F | WEIGHT: 217 LBS | OXYGEN SATURATION: 96 % | SYSTOLIC BLOOD PRESSURE: 112 MMHG | HEART RATE: 70 BPM | BODY MASS INDEX: 34.87 KG/M2

## 2024-01-17 DIAGNOSIS — R10.32 LLQ ABDOMINAL PAIN: ICD-10-CM

## 2024-01-17 LAB
APPEARANCE UR: CLEAR
BILIRUB UR STRIP-MCNC: NEGATIVE MG/DL
COLOR UR AUTO: YELLOW
GLUCOSE UR STRIP.AUTO-MCNC: NEGATIVE MG/DL
KETONES UR STRIP.AUTO-MCNC: NEGATIVE MG/DL
LEUKOCYTE ESTERASE UR QL STRIP.AUTO: NEGATIVE
NITRITE UR QL STRIP.AUTO: NEGATIVE
PH UR STRIP.AUTO: 5.5 [PH] (ref 5–8)
PROT UR QL STRIP: 30 MG/DL
RBC UR QL AUTO: NORMAL
SP GR UR STRIP.AUTO: >=1.03
UROBILINOGEN UR STRIP-MCNC: 0.2 MG/DL

## 2024-01-17 PROCEDURE — 3074F SYST BP LT 130 MM HG: CPT | Performed by: PHYSICIAN ASSISTANT

## 2024-01-17 PROCEDURE — 99213 OFFICE O/P EST LOW 20 MIN: CPT | Performed by: PHYSICIAN ASSISTANT

## 2024-01-17 PROCEDURE — 81002 URINALYSIS NONAUTO W/O SCOPE: CPT | Performed by: PHYSICIAN ASSISTANT

## 2024-01-17 PROCEDURE — 3078F DIAST BP <80 MM HG: CPT | Performed by: PHYSICIAN ASSISTANT

## 2024-01-17 ASSESSMENT — FIBROSIS 4 INDEX: FIB4 SCORE: 1.4

## 2024-01-17 ASSESSMENT — ENCOUNTER SYMPTOMS
COUGH: 0
CHILLS: 0
VOMITING: 0
SHORTNESS OF BREATH: 1
FEVER: 0
ABDOMINAL PAIN: 1
BLOOD IN STOOL: 0
CONSTIPATION: 0
DIARRHEA: 0
NAUSEA: 0

## 2024-01-17 NOTE — PROGRESS NOTES
Subjective:     Shamika Downing  is a 58 y.o. male who presents for LLQ Pain (X 1 day, SOB, pt states it feels hard)      LLQ Pain  This is a new problem. The current episode started yesterday. Pertinent negatives include no constipation, diarrhea, fever, melena, nausea or vomiting.   Patient presents urgent care noting abrupt onset of abdominal pain yesterday.  Describes pain to left lower quadrant of abdomen.  Denies much pain at rest but pain with palpation and pain with twisting/moving torso.  Patient denies change in urine.  Denies dysuria frequency or hematuria.  Denies history of renal lithiasis.  Patient denies change in stool denying diarrhea constipation blood per stool or melena.  Denies history of abdominal surgeries.  Denies radiation of left lower quadrant abdominal pain.  Patient denies noting exacerbating or alleviating factors with left lower quadrant abdominal pain.  Denies history of diverticular disease.  Patient does note recent colonoscopy without abnormal findings.    Review of Systems   Constitutional:  Negative for chills and fever.   Respiratory:  Positive for shortness of breath. Negative for cough.    Gastrointestinal:  Positive for abdominal pain. Negative for blood in stool, constipation, diarrhea, melena, nausea and vomiting.   Genitourinary: Negative.        Medications:    simvastatin Tabs    Allergies: Patient has no known allergies.    Problem List: Shamika Downing does not have any pertinent problems on file.    Surgical History:  No past surgical history on file.    Past Social Hx: Shamika Downing  reports that he has never smoked. He has never used smokeless tobacco. He reports current alcohol use. He reports that he does not use drugs.     Past Family Hx:  Shamika Downing family history includes Cancer (age of onset: 53) in his sister; No Known Problems in his daughter, daughter, father, maternal grandfather, maternal grandmother, paternal grandfather, and paternal grandmother; Other  "in his mother.     Problem list, medications, and allergies reviewed by myself today in Epic.     Objective:   /70   Pulse 70   Temp 36.1 °C (97 °F)   Resp 18   Ht 1.676 m (5' 6\")   Wt 98.4 kg (217 lb)   SpO2 96%   BMI 35.02 kg/m²     Physical Exam  Vitals and nursing note reviewed.   Constitutional:       General: He is not in acute distress.     Appearance: Normal appearance. He is well-developed. He is not diaphoretic.   HENT:      Head: Normocephalic and atraumatic.      Right Ear: External ear normal.      Left Ear: External ear normal.      Nose: Nose normal.   Eyes:      General: No scleral icterus.        Right eye: No discharge.         Left eye: No discharge.      Conjunctiva/sclera: Conjunctivae normal.   Pulmonary:      Effort: Pulmonary effort is normal. No respiratory distress.   Abdominal:      General: Abdomen is protuberant. Bowel sounds are decreased.      Palpations: Abdomen is soft.      Tenderness: There is abdominal tenderness in the left lower quadrant. There is no right CVA tenderness, left CVA tenderness, guarding or rebound. Negative signs include Manley's sign and McBurney's sign.      Comments: No guarding, no rebound, left lower quadrant abdominal pain   Musculoskeletal:         General: Normal range of motion.      Cervical back: Neck supple.   Skin:     General: Skin is warm and dry.      Coloration: Skin is not pale.   Neurological:      Mental Status: He is alert and oriented to person, place, and time.      Coordination: Coordination normal.     POCT UA -   Results for orders placed or performed in visit on 01/17/24   POCT Urinalysis   Result Value Ref Range    POC Color yellow Negative    POC Appearance clear Negative    POC Glucose negative Negative mg/dL    POC Bilirubin negative Negative mg/dL    POC Ketones negative Negative mg/dL    POC Specific Gravity >=1.030 <1.005 - >1.030    POC Blood trace-intact Negative    POC Urine PH 5.5 5.0 - 8.0    POC Protein 30 " Negative mg/dL    POC Urobiligen 0.2 Negative (0.2) mg/dL    POC Nitrites negative Negative    POC Leukocyte Esterase negative Negative           Assessment/Plan:   Assessment      1. LLQ abdominal pain  - POCT Urinalysis  Patient has been directed to [facility] ER for further management/work up now, today.  Patient is appropriate for POV transport to ER for further workup and care today.  Unclear etiology of left lower quadrant abdominal pain.  No history of renal lithiasis or diverticulitis.    I have worn an N95 mask, gloves and eye protection for the entire encounter with this patient.     Differential diagnosis, natural history, supportive care, and indications for immediate follow-up discussed.

## 2024-01-17 NOTE — LETTER
[facility]  [address] [place] [address]     January 17, 2024    Patient: Shamika Downing   YOB: 1965   Date of Visit: 1/17/2024       To Whom It May Concern:    Shamika Downing was seen and treated in our department on 1/17/2024.  He should be excused from missed work for today.    Sincerely,     Dave Miller P.A.-C.

## 2024-02-27 ENCOUNTER — OFFICE VISIT (OUTPATIENT)
Dept: MEDICAL GROUP | Facility: PHYSICIAN GROUP | Age: 59
End: 2024-02-27
Payer: COMMERCIAL

## 2024-02-27 VITALS
HEIGHT: 66 IN | SYSTOLIC BLOOD PRESSURE: 110 MMHG | TEMPERATURE: 97.8 F | BODY MASS INDEX: 32.84 KG/M2 | OXYGEN SATURATION: 97 % | WEIGHT: 204.37 LBS | RESPIRATION RATE: 16 BRPM | HEART RATE: 79 BPM | DIASTOLIC BLOOD PRESSURE: 72 MMHG

## 2024-02-27 DIAGNOSIS — D22.9 ATYPICAL NEVUS: ICD-10-CM

## 2024-02-27 DIAGNOSIS — L91.8 SKIN TAGS, MULTIPLE ACQUIRED: ICD-10-CM

## 2024-02-27 DIAGNOSIS — Z23 NEED FOR VACCINATION: ICD-10-CM

## 2024-02-27 DIAGNOSIS — Z12.83 SKIN CANCER SCREENING: ICD-10-CM

## 2024-02-27 DIAGNOSIS — E78.2 MIXED HYPERLIPIDEMIA: ICD-10-CM

## 2024-02-27 DIAGNOSIS — R73.03 PREDIABETES: ICD-10-CM

## 2024-02-27 DIAGNOSIS — Z00.00 HEALTHCARE MAINTENANCE: ICD-10-CM

## 2024-02-27 LAB — HBA1C MFR BLD: 6.2 % (ref ?–5.8)

## 2024-02-27 PROCEDURE — 99213 OFFICE O/P EST LOW 20 MIN: CPT | Mod: 25 | Performed by: STUDENT IN AN ORGANIZED HEALTH CARE EDUCATION/TRAINING PROGRAM

## 2024-02-27 PROCEDURE — 11200 RMVL SKIN TAGS UP TO&INC 15: CPT | Performed by: STUDENT IN AN ORGANIZED HEALTH CARE EDUCATION/TRAINING PROGRAM

## 2024-02-27 PROCEDURE — 3078F DIAST BP <80 MM HG: CPT | Performed by: STUDENT IN AN ORGANIZED HEALTH CARE EDUCATION/TRAINING PROGRAM

## 2024-02-27 PROCEDURE — 3074F SYST BP LT 130 MM HG: CPT | Performed by: STUDENT IN AN ORGANIZED HEALTH CARE EDUCATION/TRAINING PROGRAM

## 2024-02-27 ASSESSMENT — PATIENT HEALTH QUESTIONNAIRE - PHQ9: CLINICAL INTERPRETATION OF PHQ2 SCORE: 0

## 2024-02-27 ASSESSMENT — FIBROSIS 4 INDEX: FIB4 SCORE: 1.4

## 2024-02-27 NOTE — LETTER
[facility]  Nico Panda D.O.  [address]  [facility] [address]  Fax: [phone]   Authorization for Release/Disclosure of   Protected Health Information   Name: SHAMIKA RUIZ : 1965 SSN: [id number]   Address: [address] [place] [address] Phone:    [phone] (home)    I authorize the entity listed below to release/disclose the PHI below to:   [facility]/Nico Panda D.O. and Nico Panda D.O.   Provider or Entity Name:  [facility]    Address   City, State, Zip   Phone:      Fax:     Reason for request: continuity of care   Information to be released:    [ x ] LAST COLONOSCOPY,  including any PATH REPORT and follow-up  [  ] LAST FIT/COLOGUARD RESULT [  ] LAST DEXA  [  ] LAST MAMMOGRAM  [  ] LAST PAP  [  ] LAST LABS [  ] RETINA EXAM REPORT  [  ] IMMUNIZATION RECORDS  [  ] Release all info      [  ] Check here and initial the line next to each item to release ALL health information INCLUDING  _____ Care and treatment for drug and / or alcohol abuse  _____ HIV testing, infection status, or AIDS  _____ Genetic Testing    DATES OF SERVICE OR TIME PERIOD TO BE DISCLOSED: _____________  I understand and acknowledge that:  * This Authorization may be revoked at any time by you in writing, except if your health information has already been used or disclosed.  * Your health information that will be used or disclosed as a result of you signing this authorization could be re-disclosed by the recipient. If this occurs, your re-disclosed health information may no longer be protected by State or Federal laws.  * You may refuse to sign this Authorization. Your refusal will not affect your ability to obtain treatment.  * This Authorization becomes effective upon signing and will  on (date) __________.      If no date is indicated, this Authorization will  one (1) year from the signature date.    Name: Shamika Ruiz  Signature: Date:   2024     PLEASE FAX  REQUESTED RECORDS BACK TO: [phone]

## 2024-02-27 NOTE — LETTER
[facility]  Nico Panda D.O.  [address]  [facility] [address]  Fax: [phone]   Authorization for Release/Disclosure of   Protected Health Information   Name: SHAMIKA RUIZ : 1965 SSN: [id number]   Address: [address] [place] [address] Phone:    [phone] (home)    I authorize the entity listed below to release/disclose the PHI below to:   [facility]/Nico Panda D.O. and Nico Panda D.O.   Provider or Entity Name:     Address   City, State, [facility]   Phone:      Fax:     Reason for request: continuity of care   Information to be released:    [  ] LAST COLONOSCOPY,  including any PATH REPORT and follow-up  [  ] LAST FIT/COLOGUARD RESULT [  ] LAST DEXA  [  ] LAST MAMMOGRAM  [  ] LAST PAP  [  ] LAST LABS [  ] RETINA EXAM REPORT  [  ] IMMUNIZATION RECORDS  [  ] Release all info      [  ] Check here and initial the line next to each item to release ALL health information INCLUDING  _____ Care and treatment for drug and / or alcohol abuse  _____ HIV testing, infection status, or AIDS  _____ Genetic Testing    DATES OF SERVICE OR TIME PERIOD TO BE DISCLOSED: _____________  I understand and acknowledge that:  * This Authorization may be revoked at any time by you in writing, except if your health information has already been used or disclosed.  * Your health information that will be used or disclosed as a result of you signing this authorization could be re-disclosed by the recipient. If this occurs, your re-disclosed health information may no longer be protected by State or Federal laws.  * You may refuse to sign this Authorization. Your refusal will not affect your ability to obtain treatment.  * This Authorization becomes effective upon signing and will  on (date) __________.      If no date is indicated, this Authorization will  one (1) year from the signature date.    Name: Shamika Ruiz  Signature: Date:   2024     PLEASE FAX REQUESTED RECORDS BACK  TO: [phone]

## 2024-02-28 ENCOUNTER — RESEARCH ENCOUNTER (OUTPATIENT)
Dept: RESEARCH | Facility: MEDICAL CENTER | Age: 59
End: 2024-02-28
Payer: COMMERCIAL

## 2024-02-28 DIAGNOSIS — Z00.6 RESEARCH STUDY PATIENT: ICD-10-CM

## 2024-02-28 NOTE — RESEARCH NOTE
"Patient has been marked as \"Interested\" for HNP/MNASH. Consent form(s) have been pushed to the patient's MyChart. Sent initial follow-up message with instructions to locate and sign consent form(s).    "

## 2024-02-28 NOTE — PROGRESS NOTES
HISTORY OF PRESENT ILLNESS: Shamika is a pleasant 58 y.o. male, established patient who presents today to discuss medical problems as listed below:    Problem   Skin Tags, Multiple Acquired    He has multiple painful skin tags in his axilla that have been getting very irritated recently.  There is 1 significantly tender tag on the right axilla.          Current Outpatient Medications Ordered in Epic   Medication Sig Dispense Refill    simvastatin (ZOCOR) 40 MG Tab TAKE 1 TABLET EVERY EVENING 90 Tablet 3     No current Epic-ordered facility-administered medications on file.       Review of systems:  Per HPI    Patient Active Problem List    Diagnosis Date Noted    Skin tags, multiple acquired 02/27/2024    Shoulder pain, bilateral 04/06/2022    Obesity (BMI 30-39.9) 04/06/2022    Healthcare maintenance 04/06/2022    Prediabetes 04/09/2019    Hyperlipidemia 04/08/2019    Vertigo 03/12/2019    Intermittent urinary stream 03/12/2019     No past surgical history on file.  Social History     Tobacco Use    Smoking status: Never    Smokeless tobacco: Never   Vaping Use    Vaping Use: Never used   Substance Use Topics    Alcohol use: Yes     Comment: occasionally    Drug use: No      Family History   Problem Relation Age of Onset    Other Mother         Cirrhosis    No Known Problems Father     Cancer Sister 53        cervical    No Known Problems Maternal Grandmother     No Known Problems Maternal Grandfather     No Known Problems Paternal Grandmother     No Known Problems Paternal Grandfather     No Known Problems Daughter     No Known Problems Daughter      Current Outpatient Medications   Medication Sig Dispense Refill    simvastatin (ZOCOR) 40 MG Tab TAKE 1 TABLET EVERY EVENING 90 Tablet 3     No current facility-administered medications for this visit.       Allergies:  No Known Allergies    Allergies, past medical history, past surgical history, family history, social history reviewed and updated.    Objective:    BP  "110/72   Pulse 79   Temp 36.6 °C (97.8 °F) (Temporal)   Resp 16   Ht 1.676 m (5' 6\")   Wt 92.7 kg (204 lb 5.9 oz)   SpO2 97%   BMI 32.99 kg/m²    Body mass index is 32.99 kg/m².    Physical exam:  General: Normal appearance, no acute distress, not ill-appearing  HEENT: EOM intact, conjunctiva normal limits, negative right/left eye discharge.  Sclera anicteric  Cardiovascular: Normal rate and rhythm, no murmurs  Pulmonary: No respiratory distress, no wheezing, no rales, breath sounds normal.  Musculoskeletal: No edema bilaterally  Skin: Multiple skin tags axilla.  Multiple nevi on torso abdomen.  1 dry erythematous scaly patch on his left forehead  Neurological: No focal deficits, normal gait  Psychiatric: Mood within normal limits    Assessment/Plan:    Problem List Items Addressed This Visit       Hyperlipidemia    Relevant Orders    Lipid Profile    Prediabetes    Relevant Orders    HEMOGLOBIN A1C    Healthcare maintenance    Relevant Orders    Comp Metabolic Panel    Skin tags, multiple acquired       Procedure note:  Removal of 1 skin tag underneath the right axilla using a derma blade.  Area was sterilized with alcohol swab prior to procedure.  Good hemostasis afterwards, patient tolerated procedure well.  Will have him come back in in about a month for the rest of the skin tags as we are out of liquid nitrogen today.          Other Visit Diagnoses       Need for vaccination        Atypical nevus        Relevant Orders    Referral to Dermatology    Skin cancer screening        Relevant Orders    Referral to Dermatology             Return in about 4 weeks (around 3/26/2024) for Annual, labs.   "

## 2024-02-28 NOTE — ASSESSMENT & PLAN NOTE
Procedure note:  Removal of 1 skin tag underneath the right axilla using a derma blade.  Area was sterilized with alcohol swab prior to procedure.  Good hemostasis afterwards, patient tolerated procedure well.  Will have him come back in in about a month for the rest of the skin tags as we are out of liquid nitrogen today.

## 2024-03-03 DIAGNOSIS — E78.2 MIXED HYPERLIPIDEMIA: ICD-10-CM

## 2024-03-04 DIAGNOSIS — Z00.6 RESEARCH STUDY PATIENT: ICD-10-CM

## 2024-03-05 ENCOUNTER — HOSPITAL ENCOUNTER (OUTPATIENT)
Dept: LAB | Facility: MEDICAL CENTER | Age: 59
End: 2024-03-05
Attending: STUDENT IN AN ORGANIZED HEALTH CARE EDUCATION/TRAINING PROGRAM
Payer: COMMERCIAL

## 2024-03-05 DIAGNOSIS — Z00.6 RESEARCH STUDY PATIENT: ICD-10-CM

## 2024-03-05 RX ORDER — SIMVASTATIN 40 MG
TABLET ORAL
Qty: 90 TABLET | Refills: 3 | Status: SHIPPED | OUTPATIENT
Start: 2024-03-05

## 2024-03-05 NOTE — RESEARCH NOTE
Confirmed with the participant which designated provider (Nico Panda D.O.) they would like study results shared with. Patient will have an opportunity to share the results with any providers of their choosing in the future by accessing their results from [employer].

## 2024-03-05 NOTE — TELEPHONE ENCOUNTER
Received request via: Patient    Was the patient seen in the last year in this department? Yes    Does the patient have an active prescription (recently filled or refills available) for medication(s) requested? No    Pharmacy Name: [facility]    Does the patient have senior living Plus and need 100 day supply (blood pressure, diabetes and cholesterol meds only)? Patient does not have SCP

## 2024-03-08 LAB
ELF SCORE: 8.44 PPM (ref 9.8–11.3)
RELATIVE RISK: NORMAL
RISK GROUP: NORMAL
RISK: 3.3 %

## 2024-03-20 ENCOUNTER — APPOINTMENT (RX ONLY)
Dept: URBAN - METROPOLITAN AREA CLINIC 31 | Facility: CLINIC | Age: 59
Setting detail: DERMATOLOGY
End: 2024-03-20

## 2024-03-20 DIAGNOSIS — L81.4 OTHER MELANIN HYPERPIGMENTATION: ICD-10-CM

## 2024-03-20 DIAGNOSIS — D485 NEOPLASM OF UNCERTAIN BEHAVIOR OF SKIN: ICD-10-CM

## 2024-03-20 DIAGNOSIS — L82.1 OTHER SEBORRHEIC KERATOSIS: ICD-10-CM

## 2024-03-20 DIAGNOSIS — L57.0 ACTINIC KERATOSIS: ICD-10-CM

## 2024-03-20 PROBLEM — D48.5 NEOPLASM OF UNCERTAIN BEHAVIOR OF SKIN: Status: ACTIVE | Noted: 2024-03-20

## 2024-03-20 PROCEDURE — 99203 OFFICE O/P NEW LOW 30 MIN: CPT | Mod: 25

## 2024-03-20 PROCEDURE — 11102 TANGNTL BX SKIN SINGLE LES: CPT

## 2024-03-20 PROCEDURE — 17003 DESTRUCT PREMALG LES 2-14: CPT

## 2024-03-20 PROCEDURE — 11103 TANGNTL BX SKIN EA SEP/ADDL: CPT

## 2024-03-20 PROCEDURE — ? LIQUID NITROGEN

## 2024-03-20 PROCEDURE — ? BIOPSY BY SHAVE METHOD

## 2024-03-20 PROCEDURE — ? COUNSELING

## 2024-03-20 PROCEDURE — 17000 DESTRUCT PREMALG LESION: CPT | Mod: 59

## 2024-03-20 ASSESSMENT — LOCATION SIMPLE DESCRIPTION DERM
LOCATION SIMPLE: LEFT LOWER BACK
LOCATION SIMPLE: ABDOMEN
LOCATION SIMPLE: LEFT FOREHEAD
LOCATION SIMPLE: RIGHT LOWER BACK
LOCATION SIMPLE: RIGHT CHEEK
LOCATION SIMPLE: RIGHT FOREARM
LOCATION SIMPLE: LEFT FOREARM

## 2024-03-20 ASSESSMENT — LOCATION DETAILED DESCRIPTION DERM
LOCATION DETAILED: LEFT SUPERIOR LATERAL MIDBACK
LOCATION DETAILED: RIGHT INFERIOR LATERAL MALAR CHEEK
LOCATION DETAILED: RIGHT SUPERIOR MEDIAL LOWER BACK
LOCATION DETAILED: LEFT DISTAL RADIAL DORSAL FOREARM
LOCATION DETAILED: LEFT LATERAL FOREHEAD
LOCATION DETAILED: RIGHT DISTAL DORSAL FOREARM
LOCATION DETAILED: RIGHT LATERAL ABDOMEN

## 2024-03-20 ASSESSMENT — LOCATION ZONE DERM
LOCATION ZONE: TRUNK
LOCATION ZONE: FACE
LOCATION ZONE: ARM

## 2024-03-20 NOTE — PROCEDURE: LIQUID NITROGEN
Duration Of Freeze Thaw-Cycle (Seconds): 3
Post-Care Instructions: I reviewed with the patient in detail post-care instructions. Patient is to wear sunprotection, and avoid picking at any of the treated lesions. Pt may apply Vaseline to crusted or scabbing areas.
Application Tool (Optional): Cry-AC
Render Post-Care Instructions In Note?: no
Number Of Freeze-Thaw Cycles: 1 freeze-thaw cycle
Consent: The patient's consent was obtained including but not limited to risks of crusting, scabbing, blistering, scarring, darker or lighter pigmentary change, recurrence, incomplete removal and infection.
Show Aperture Variable?: Yes
Detail Level: Detailed

## 2024-03-27 ENCOUNTER — APPOINTMENT (OUTPATIENT)
Dept: MEDICAL GROUP | Facility: PHYSICIAN GROUP | Age: 59
End: 2024-03-27
Payer: COMMERCIAL

## 2024-04-24 LAB
APOB+LDLR+PCSK9 GENE MUT ANL BLD/T: NOT DETECTED
BRCA1+BRCA2 DEL+DUP + FULL MUT ANL BLD/T: NOT DETECTED
MLH1+MSH2+MSH6+PMS2 GN DEL+DUP+FUL M: NOT DETECTED

## 2024-09-09 ENCOUNTER — TELEPHONE (OUTPATIENT)
Dept: MEDICAL GROUP | Facility: PHYSICIAN GROUP | Age: 59
End: 2024-09-09
Payer: COMMERCIAL

## 2024-09-09 NOTE — TELEPHONE ENCOUNTER
Patient called office requesting a referral to [facility] to follow up Please advise.

## 2024-09-10 DIAGNOSIS — R39.13 SPLITTING OF URINARY STREAM: ICD-10-CM

## 2024-10-01 DIAGNOSIS — G47.33 OSA ON CPAP: ICD-10-CM

## 2024-10-11 DIAGNOSIS — G47.33 OSA ON CPAP: ICD-10-CM

## 2024-10-23 ENCOUNTER — OFFICE VISIT (OUTPATIENT)
Dept: UROLOGY | Facility: MEDICAL CENTER | Age: 59
End: 2024-10-23
Payer: COMMERCIAL

## 2024-10-23 VITALS — HEIGHT: 66 IN | WEIGHT: 204 LBS | BODY MASS INDEX: 32.78 KG/M2

## 2024-10-23 DIAGNOSIS — Z12.5 PROSTATE CANCER SCREENING: ICD-10-CM

## 2024-10-23 DIAGNOSIS — E78.2 MIXED HYPERLIPIDEMIA: ICD-10-CM

## 2024-10-23 PROCEDURE — 99213 OFFICE O/P EST LOW 20 MIN: CPT | Performed by: STUDENT IN AN ORGANIZED HEALTH CARE EDUCATION/TRAINING PROGRAM

## 2024-10-23 ASSESSMENT — FIBROSIS 4 INDEX: FIB4 SCORE: 1.43

## 2024-11-01 ENCOUNTER — HOSPITAL ENCOUNTER (OUTPATIENT)
Dept: LAB | Facility: MEDICAL CENTER | Age: 59
End: 2024-11-01
Attending: STUDENT IN AN ORGANIZED HEALTH CARE EDUCATION/TRAINING PROGRAM
Payer: COMMERCIAL

## 2024-11-01 DIAGNOSIS — Z00.00 HEALTHCARE MAINTENANCE: ICD-10-CM

## 2024-11-01 DIAGNOSIS — E78.2 MIXED HYPERLIPIDEMIA: ICD-10-CM

## 2024-11-01 DIAGNOSIS — Z12.5 PROSTATE CANCER SCREENING: ICD-10-CM

## 2024-11-01 DIAGNOSIS — R73.03 PREDIABETES: ICD-10-CM

## 2024-11-01 LAB
ALBUMIN SERPL BCP-MCNC: 4.5 G/DL (ref 3.2–4.9)
ALBUMIN/GLOB SERPL: 1.6 G/DL
ALP SERPL-CCNC: 55 U/L (ref 30–99)
ALT SERPL-CCNC: 49 U/L (ref 2–50)
ANION GAP SERPL CALC-SCNC: 13 MMOL/L (ref 7–16)
AST SERPL-CCNC: 30 U/L (ref 12–45)
BILIRUB SERPL-MCNC: 0.6 MG/DL (ref 0.1–1.5)
BUN SERPL-MCNC: 15 MG/DL (ref 8–22)
CALCIUM ALBUM COR SERPL-MCNC: 8.7 MG/DL (ref 8.5–10.5)
CALCIUM SERPL-MCNC: 9.1 MG/DL (ref 8.5–10.5)
CHLORIDE SERPL-SCNC: 102 MMOL/L (ref 96–112)
CHOLEST SERPL-MCNC: 167 MG/DL (ref 100–199)
CO2 SERPL-SCNC: 25 MMOL/L (ref 20–33)
CREAT SERPL-MCNC: 1.06 MG/DL (ref 0.5–1.4)
EST. AVERAGE GLUCOSE BLD GHB EST-MCNC: 128 MG/DL
FASTING STATUS PATIENT QL REPORTED: NORMAL
GFR SERPLBLD CREATININE-BSD FMLA CKD-EPI: 80 ML/MIN/1.73 M 2
GLOBULIN SER CALC-MCNC: 2.9 G/DL (ref 1.9–3.5)
GLUCOSE SERPL-MCNC: 107 MG/DL (ref 65–99)
HBA1C MFR BLD: 6.1 % (ref 4–5.6)
HDLC SERPL-MCNC: 35 MG/DL
LDLC SERPL CALC-MCNC: 90 MG/DL
POTASSIUM SERPL-SCNC: 4.4 MMOL/L (ref 3.6–5.5)
PROT SERPL-MCNC: 7.4 G/DL (ref 6–8.2)
PSA SERPL-MCNC: 1.41 NG/ML (ref 0–4)
SODIUM SERPL-SCNC: 140 MMOL/L (ref 135–145)
TRIGL SERPL-MCNC: 210 MG/DL (ref 0–149)

## 2024-11-01 PROCEDURE — 84153 ASSAY OF PSA TOTAL: CPT

## 2024-11-01 PROCEDURE — 36415 COLL VENOUS BLD VENIPUNCTURE: CPT

## 2024-11-01 PROCEDURE — 80053 COMPREHEN METABOLIC PANEL: CPT

## 2024-11-01 PROCEDURE — 83036 HEMOGLOBIN GLYCOSYLATED A1C: CPT

## 2024-11-01 PROCEDURE — 80061 LIPID PANEL: CPT

## 2024-12-10 ENCOUNTER — PATIENT MESSAGE (OUTPATIENT)
Dept: UROLOGY | Facility: MEDICAL CENTER | Age: 59
End: 2024-12-10
Payer: COMMERCIAL

## 2024-12-10 RX ORDER — SILDENAFIL 100 MG/1
100 TABLET, FILM COATED ORAL
Qty: 10 TABLET | Refills: 3 | Status: SHIPPED | OUTPATIENT
Start: 2024-12-10

## 2024-12-13 ENCOUNTER — PATIENT MESSAGE (OUTPATIENT)
Dept: UROLOGY | Facility: MEDICAL CENTER | Age: 59
End: 2024-12-13
Payer: COMMERCIAL

## 2024-12-13 RX ORDER — KETOROLAC TROMETHAMINE 10 MG/1
10 TABLET, FILM COATED ORAL EVERY 6 HOURS
Qty: 20 TABLET | Refills: 0 | Status: SHIPPED | OUTPATIENT
Start: 2024-12-13

## 2024-12-30 PROCEDURE — 93005 ELECTROCARDIOGRAM TRACING: CPT | Mod: TC | Performed by: EMERGENCY MEDICINE

## 2024-12-30 PROCEDURE — 99284 EMERGENCY DEPT VISIT MOD MDM: CPT

## 2024-12-31 ENCOUNTER — APPOINTMENT (OUTPATIENT)
Dept: RADIOLOGY | Facility: MEDICAL CENTER | Age: 59
End: 2024-12-31
Attending: EMERGENCY MEDICINE
Payer: COMMERCIAL

## 2024-12-31 ENCOUNTER — HOSPITAL ENCOUNTER (EMERGENCY)
Facility: MEDICAL CENTER | Age: 59
End: 2024-12-31
Attending: EMERGENCY MEDICINE
Payer: COMMERCIAL

## 2024-12-31 VITALS
HEART RATE: 74 BPM | RESPIRATION RATE: 18 BRPM | HEIGHT: 66 IN | SYSTOLIC BLOOD PRESSURE: 136 MMHG | WEIGHT: 198.63 LBS | TEMPERATURE: 97.4 F | BODY MASS INDEX: 31.92 KG/M2 | DIASTOLIC BLOOD PRESSURE: 63 MMHG | OXYGEN SATURATION: 97 %

## 2024-12-31 DIAGNOSIS — R07.9 CHEST PAIN, UNSPECIFIED TYPE: Primary | ICD-10-CM

## 2024-12-31 LAB
ALBUMIN SERPL BCP-MCNC: 4.5 G/DL (ref 3.2–4.9)
ALBUMIN/GLOB SERPL: 1.7 G/DL
ALP SERPL-CCNC: 54 U/L (ref 30–99)
ALT SERPL-CCNC: 35 U/L (ref 2–50)
ANION GAP SERPL CALC-SCNC: 14 MMOL/L (ref 7–16)
AST SERPL-CCNC: 21 U/L (ref 12–45)
BASOPHILS # BLD AUTO: 0.8 % (ref 0–1.8)
BASOPHILS # BLD: 0.06 K/UL (ref 0–0.12)
BILIRUB SERPL-MCNC: 0.4 MG/DL (ref 0.1–1.5)
BUN SERPL-MCNC: 16 MG/DL (ref 8–22)
CALCIUM ALBUM COR SERPL-MCNC: 8.5 MG/DL (ref 8.5–10.5)
CALCIUM SERPL-MCNC: 8.9 MG/DL (ref 8.5–10.5)
CHLORIDE SERPL-SCNC: 102 MMOL/L (ref 96–112)
CO2 SERPL-SCNC: 24 MMOL/L (ref 20–33)
CREAT SERPL-MCNC: 0.73 MG/DL (ref 0.5–1.4)
EKG IMPRESSION: NORMAL
EOSINOPHIL # BLD AUTO: 0.17 K/UL (ref 0–0.51)
EOSINOPHIL NFR BLD: 2.3 % (ref 0–6.9)
ERYTHROCYTE [DISTWIDTH] IN BLOOD BY AUTOMATED COUNT: 41.3 FL (ref 35.9–50)
GFR SERPLBLD CREATININE-BSD FMLA CKD-EPI: 103 ML/MIN/1.73 M 2
GLOBULIN SER CALC-MCNC: 2.7 G/DL (ref 1.9–3.5)
GLUCOSE SERPL-MCNC: 146 MG/DL (ref 65–99)
HCT VFR BLD AUTO: 44.8 % (ref 42–52)
HGB BLD-MCNC: 15.5 G/DL (ref 14–18)
IMM GRANULOCYTES # BLD AUTO: 0.02 K/UL (ref 0–0.11)
IMM GRANULOCYTES NFR BLD AUTO: 0.3 % (ref 0–0.9)
LYMPHOCYTES # BLD AUTO: 2.21 K/UL (ref 1–4.8)
LYMPHOCYTES NFR BLD: 29.5 % (ref 22–41)
MCH RBC QN AUTO: 30.9 PG (ref 27–33)
MCHC RBC AUTO-ENTMCNC: 34.6 G/DL (ref 32.3–36.5)
MCV RBC AUTO: 89.4 FL (ref 81.4–97.8)
MONOCYTES # BLD AUTO: 0.63 K/UL (ref 0–0.85)
MONOCYTES NFR BLD AUTO: 8.4 % (ref 0–13.4)
NEUTROPHILS # BLD AUTO: 4.39 K/UL (ref 1.82–7.42)
NEUTROPHILS NFR BLD: 58.7 % (ref 44–72)
NRBC # BLD AUTO: 0 K/UL
NRBC BLD-RTO: 0 /100 WBC (ref 0–0.2)
NT-PROBNP SERPL IA-MCNC: 58 PG/ML (ref 0–125)
PLATELET # BLD AUTO: 221 K/UL (ref 164–446)
PMV BLD AUTO: 10.7 FL (ref 9–12.9)
POTASSIUM SERPL-SCNC: 3.6 MMOL/L (ref 3.6–5.5)
PROT SERPL-MCNC: 7.2 G/DL (ref 6–8.2)
RBC # BLD AUTO: 5.01 M/UL (ref 4.7–6.1)
SODIUM SERPL-SCNC: 140 MMOL/L (ref 135–145)
TROPONIN T SERPL-MCNC: <6 NG/L (ref 6–19)
WBC # BLD AUTO: 7.5 K/UL (ref 4.8–10.8)

## 2024-12-31 PROCEDURE — 84484 ASSAY OF TROPONIN QUANT: CPT

## 2024-12-31 PROCEDURE — 80053 COMPREHEN METABOLIC PANEL: CPT

## 2024-12-31 PROCEDURE — 71045 X-RAY EXAM CHEST 1 VIEW: CPT

## 2024-12-31 PROCEDURE — 83880 ASSAY OF NATRIURETIC PEPTIDE: CPT

## 2024-12-31 PROCEDURE — 85025 COMPLETE CBC W/AUTO DIFF WBC: CPT

## 2024-12-31 PROCEDURE — 36415 COLL VENOUS BLD VENIPUNCTURE: CPT

## 2024-12-31 ASSESSMENT — FIBROSIS 4 INDEX: FIB4 SCORE: 1.13

## 2024-12-31 ASSESSMENT — PAIN DESCRIPTION - PAIN TYPE: TYPE: ACUTE PAIN

## 2024-12-31 NOTE — DISCHARGE INSTRUCTIONS
You follow-up with your primary care team for further evaluation.  However at this time, your vital signs, workup and exam here are all normal.  This is very reassuring.  If you have any worsening symptoms or concerns please come back to the ED.  Negative coming today.

## 2024-12-31 NOTE — ED TRIAGE NOTES
"Chief Complaint   Patient presents with    Chest Pain     Pt has been having 2/10 aching R sided chest pain for the past 4 days with intermittent SOB. Denies dizziness. Hx of stroke. VSS.      Pt is alert and oriented, speaking in full sentences, follows commands and responds appropriately to questions. Resperations are even and unlabored.      Pt placed in [facility] for lab draw. Pt educated on triage process. Pt encouraged to alert staff for any changes.    /74   Pulse 75   Temp 36.7 °C (98 °F) (Oral)   Resp 16   Ht 1.676 m (5' 6\")   Wt 90.1 kg (198 lb 10.2 oz)   SpO2 98%      "

## 2024-12-31 NOTE — ED PROVIDER NOTES
ED Provider Note    Scribed for Lloyd Manley by Evelin Bentley. 12/31/2024  1:32 AM    Primary care provider: Nico Panda D.O.  Means of arrival: Private vehicle  History obtained from: Patient  History limited by: None    CHIEF COMPLAINT  Chief Complaint   Patient presents with    Chest Pain     Pt has been having 2/10 aching R sided chest pain for the past 4 days with intermittent SOB. Denies dizziness. Hx of stroke. VSS.      EXTERNAL RECORDS REVIEWED  Outpatient Notes The patient was seen by Urology in October for epididymitis and a prostate cancer screening.    HPI/ROS  LIMITATION TO HISTORY   Select: : None  OUTSIDE HISTORIAN(S):  None.    HPI  Shamika Downing is a 59 y.o. male who presents to the Emergency Department for evaluation of right sided chest pain onset 4 days ago. He describes his chest pain as an aching pain and states it is a 2/10 in severity. The patient states he also has intermittent shortness of breath, but denies any dizziness, nausea or vomiting. No alleviating or exacerbating factors noted. The patient socially drinks, but denies the use of drugs or tobacco.     REVIEW OF SYSTEMS  As above, all other systems reviewed and are negative.   See HPI for further details.     PAST MEDICAL HISTORY   has a past medical history of History of stroke.    SURGICAL HISTORY  patient denies any surgical history    SOCIAL HISTORY  Social History     Tobacco Use    Smoking status: Never    Smokeless tobacco: Never   Vaping Use    Vaping status: Never Used   Substance Use Topics    Alcohol use: Yes     Comment: occasionally    Drug use: No      Social History     Substance and Sexual Activity   Drug Use No     FAMILY HISTORY  Family History   Problem Relation Age of Onset    Other Mother         Cirrhosis    No Known Problems Father     Cancer Sister 53        cervical    No Known Problems Maternal Grandmother     No Known Problems Maternal Grandfather     No Known Problems Paternal  "Grandmother     No Known Problems Paternal Grandfather     No Known Problems Daughter     No Known Problems Daughter      CURRENT MEDICATIONS  Home Medications       Reviewed by Madelyn Rodriguez R.N. (Registered Nurse) on 12/31/24 at 0015  Med List Status: Not Addressed     Medication Last Dose Status   ketorolac (TORADOL) 10 MG Tab  Active   sildenafil citrate (VIAGRA) 100 MG tablet  Active   simvastatin (ZOCOR) 40 MG Tab  Active                  ALLERGIES  No Known Allergies    PHYSICAL EXAM    VITAL SIGNS:   Vitals:    12/30/24 2354 12/31/24 0012 12/31/24 0142 12/31/24 0149   BP: 128/74   136/63   Pulse: 75  72 74   Resp: 16   18   Temp: 36.7 °C (98 °F)   36.3 °C (97.4 °F)   TempSrc: Oral   Temporal   SpO2: 98%  95% 97%   Weight:  90.1 kg (198 lb 10.2 oz)     Height:  1.676 m (5' 6\")       Vitals: My interpretation: normotensive, not tachycardic, afebrile, not hypoxic    Reinterpretation of vitals: Unchanged unremarkable    Cardiac Monitor Interpretation: The cardiac monitor revealed normal Sinus Rhythm as interpreted by me. The cardiac monitor was ordered secondary to the patient's history of chest pain and to monitor for dysrhythmia and/or tachycardia.    PE:   Gen: sitting comfortably, speaking clearly, appears in no acute distress   ENT: Mucous membranes moist, posterior pharynx clear, uvula midline, nares patent bilaterally   Neck: Supple, FROM  Pulmonary: Lungs are clear to auscultation bilaterally. No tachypnea  CV:  RRR, no murmur appreciated, pulses 2+ in both upper and lower extremities  Abdomen: soft, NT/ND; no rebound/guarding  : no CVA or suprapubic tenderness   Neuro: A&Ox4 (person, place, time, situation), speech fluent, gait steady, no focal deficits appreciated  Skin: No rash or lesions.  No pallor or jaundice.  No cyanosis.  Warm and dry.     DIAGNOSTIC STUDIES / PROCEDURES    LABS  Results for orders placed or performed during the hospital encounter of 12/31/24   CBC with Differential    " Collection Time: 12/31/24 12:42 AM   Result Value Ref Range    WBC 7.5 4.8 - 10.8 K/uL    RBC 5.01 4.70 - 6.10 M/uL    Hemoglobin 15.5 14.0 - 18.0 g/dL    Hematocrit 44.8 42.0 - 52.0 %    MCV 89.4 81.4 - 97.8 fL    MCH 30.9 27.0 - 33.0 pg    MCHC 34.6 32.3 - 36.5 g/dL    RDW 41.3 35.9 - 50.0 fL    Platelet Count 221 164 - 446 K/uL    MPV 10.7 9.0 - 12.9 fL    Neutrophils-Polys 58.70 44.00 - 72.00 %    Lymphocytes 29.50 22.00 - 41.00 %    Monocytes 8.40 0.00 - 13.40 %    Eosinophils 2.30 0.00 - 6.90 %    Basophils 0.80 0.00 - 1.80 %    Immature Granulocytes 0.30 0.00 - 0.90 %    Nucleated RBC 0.00 0.00 - 0.20 /100 WBC    Neutrophils (Absolute) 4.39 1.82 - 7.42 K/uL    Lymphs (Absolute) 2.21 1.00 - 4.80 K/uL    Monos (Absolute) 0.63 0.00 - 0.85 K/uL    Eos (Absolute) 0.17 0.00 - 0.51 K/uL    Baso (Absolute) 0.06 0.00 - 0.12 K/uL    Immature Granulocytes (abs) 0.02 0.00 - 0.11 K/uL    NRBC (Absolute) 0.00 K/uL   Complete Metabolic Panel (CMP)    Collection Time: 12/31/24 12:42 AM   Result Value Ref Range    Sodium 140 135 - 145 mmol/L    Potassium 3.6 3.6 - 5.5 mmol/L    Chloride 102 96 - 112 mmol/L    Co2 24 20 - 33 mmol/L    Anion Gap 14.0 7.0 - 16.0    Glucose 146 (H) 65 - 99 mg/dL    Bun 16 8 - 22 mg/dL    Creatinine 0.73 0.50 - 1.40 mg/dL    Calcium 8.9 8.5 - 10.5 mg/dL    Correct Calcium 8.5 8.5 - 10.5 mg/dL    AST(SGOT) 21 12 - 45 U/L    ALT(SGPT) 35 2 - 50 U/L    Alkaline Phosphatase 54 30 - 99 U/L    Total Bilirubin 0.4 0.1 - 1.5 mg/dL    Albumin 4.5 3.2 - 4.9 g/dL    Total Protein 7.2 6.0 - 8.2 g/dL    Globulin 2.7 1.9 - 3.5 g/dL    A-G Ratio 1.7 g/dL   proBrain Natriuretic Peptide, NT (BNP)    Collection Time: 12/31/24 12:42 AM   Result Value Ref Range    NT-proBNP 58 0 - 125 pg/mL   Troponins NOW    Collection Time: 12/31/24 12:42 AM   Result Value Ref Range    Troponin T <6 6 - 19 ng/L   ESTIMATED GFR    Collection Time: 12/31/24 12:42 AM   Result Value Ref Range    GFR (CKD-EPI) 103 >60 mL/min/1.73 m 2       All labs reviewed by me. Labs were compared to prior labs if they were available. Significant for no leukocytosis, no anemia, normal electrolytes, normal glucose, normal renal function, normal liver enzymes, no bilirubin.  Normal troponin, negative BNP.     RADIOLOGY  I have independently interpreted the diagnostic imaging associated with this visit and am waiting the final reading from the radiologist.   My preliminary interpretation is a follows: On my independent interpretation patient has no new significant cardiomegaly or focal consolidative process on CXR.     Radiologist interpretation is as follows:  DX-CHEST-PORTABLE (1 VIEW)   Final Result      No acute cardiopulmonary abnormality.        COURSE & MEDICAL DECISION MAKING  Nursing notes, VS, PMSFHx, labs, imaging, EKG reviewed in chart.    Heart Score: Low    ED Observation Status? No; Patient does not meet criteria for ED Observation.     Ddx: PE, MI, pneumonia, costochondritis    MDM: 1:32 AM Shamika Downing is a 59 y.o. male who presented with evaluation of nonspecific right-sided chest pain for the past 4 days with no other concomitant symptoms.  No lightheadedness, dizziness, denies shortness of breath to me.  He is extremely healthy.  Vital signs unremarkable.  No cardiac history.  Physical exam benign.  Chest x-ray on my independent or potation shows no cardiomegaly or full consolidative process.  EKG without ischemic changes.  Labs including CBC, CMP, troponin and BNP are negative.  Very reassuring overall.  Heart score low.  I am not concerned at this time for pulmonary embolism.  I think the patient needs further evaluation or treatment here in the ED at this time is appropriate for outpatient follow-up.  He verbalized understanding strict return precautions, is very well-appearing at time of discharge with normal vital signs and he is amenable.    ADDITIONAL PROBLEM LIST AND DISPOSITION    I have discussed management of the patient with the  following physicians and SURY's:  None.    Discussion of management with other Q or appropriate source(s): None     Escalation of care considered, and ultimately not performed:acute inpatient care management, however at this time, the patient is most appropriate for outpatient management    Barriers to care at this time, including but not limited to:  None known .     Decision tools and prescription drugs considered including, but not limited to: Pain Medications tylenol .    FINAL IMPRESSION  1. Chest pain, unspecified type Acute      IEvelin (Scribe), am scribing for, and in the presence of, Lloyd Manley.    Electronically signed by: Evelin Bentley (Scribe), 12/31/2024    I, Lloyd Manley personally performed the services described in this documentation, as scribed by Evelin Bentley in my presence, and it is both accurate and complete.    The note accurately reflects work and decisions made by me.  Lloyd Manley  12/31/2024  1:48 AM

## 2025-01-29 ENCOUNTER — APPOINTMENT (OUTPATIENT)
Dept: MEDICAL GROUP | Facility: PHYSICIAN GROUP | Age: 60
End: 2025-01-29
Payer: COMMERCIAL

## 2025-02-25 DIAGNOSIS — E78.2 MIXED HYPERLIPIDEMIA: ICD-10-CM

## 2025-02-26 RX ORDER — SIMVASTATIN 40 MG
40 TABLET ORAL EVERY EVENING
Qty: 90 TABLET | Refills: 3 | Status: SHIPPED | OUTPATIENT
Start: 2025-02-26

## 2025-03-05 DIAGNOSIS — E78.2 MIXED HYPERLIPIDEMIA: ICD-10-CM

## 2025-03-24 ENCOUNTER — HOSPITAL ENCOUNTER (OUTPATIENT)
Dept: LAB | Facility: MEDICAL CENTER | Age: 60
End: 2025-03-24
Attending: STUDENT IN AN ORGANIZED HEALTH CARE EDUCATION/TRAINING PROGRAM
Payer: COMMERCIAL

## 2025-03-24 DIAGNOSIS — E78.2 MIXED HYPERLIPIDEMIA: ICD-10-CM

## 2025-03-24 LAB
CHOLEST SERPL-MCNC: 233 MG/DL (ref 100–199)
HDLC SERPL-MCNC: 36 MG/DL
LDLC SERPL CALC-MCNC: 132 MG/DL
TRIGL SERPL-MCNC: 324 MG/DL (ref 0–149)

## 2025-03-24 PROCEDURE — 36415 COLL VENOUS BLD VENIPUNCTURE: CPT

## 2025-03-24 PROCEDURE — 80061 LIPID PANEL: CPT

## 2025-03-25 ENCOUNTER — RESULTS FOLLOW-UP (OUTPATIENT)
Dept: MEDICAL GROUP | Facility: PHYSICIAN GROUP | Age: 60
End: 2025-03-25

## 2025-04-02 ENCOUNTER — TELEMEDICINE (OUTPATIENT)
Dept: MEDICAL GROUP | Facility: PHYSICIAN GROUP | Age: 60
End: 2025-04-02
Payer: COMMERCIAL

## 2025-04-02 VITALS — WEIGHT: 194 LBS | BODY MASS INDEX: 31.18 KG/M2 | HEIGHT: 66 IN

## 2025-04-02 DIAGNOSIS — E78.2 MIXED HYPERLIPIDEMIA: ICD-10-CM

## 2025-04-02 PROCEDURE — 99213 OFFICE O/P EST LOW 20 MIN: CPT | Performed by: STUDENT IN AN ORGANIZED HEALTH CARE EDUCATION/TRAINING PROGRAM

## 2025-04-02 ASSESSMENT — FIBROSIS 4 INDEX: FIB4 SCORE: 0.98

## 2025-04-02 ASSESSMENT — PATIENT HEALTH QUESTIONNAIRE - PHQ9: CLINICAL INTERPRETATION OF PHQ2 SCORE: 0

## 2025-04-03 NOTE — PROGRESS NOTES
Virtual Visit: Established Patient   This visit was conducted via Teams using secure and encrypted videoconferencing technology.   The patient was in their home in the [facility].    The patient's identity was confirmed and verbal consent was obtained for this virtual visit.    Subjective:   CC:   Chief Complaint   Patient presents with    Lab Results     Shamika Downing is a 59 y.o. male presenting for evaluation and management of:    History of Present Illness  59-year-old male presents via virtual visit for hyperlipidemia evaluation.    Labs on 03/24/2025: total cholesterol 233, triglycerides 324, HDL 36, . Adheres to healthy diet and exercise regimen: eliminates meat and eggs, occasionally consumes cheese, primarily eats vegetables and fish weekly, avoids fried foods, high intake of sweets, consumes [employer]. Despite dietary changes, cholesterol levels remain elevated. Active lifestyle, walks several miles daily. Concerned about elevated cholesterol and cardiovascular health. Initially lost 20 pounds after ceasing meat consumption, weight plateaued. Inconsistent use of Zocor 40 mg daily over past three months due to travel, missed ~50 doses.    MEDICATIONS  Zocor        ROS   Denies chest pain, sob , edema     Current medicines (including changes today)  Current Outpatient Medications   Medication Sig Dispense Refill    simvastatin (ZOCOR) 40 MG Tab TAKE 1 TABLET EVERY EVENING 90 Tablet 3    ketorolac (TORADOL) 10 MG Tab Take 1 Tablet by mouth every 6 hours. 20 Tablet 0    sildenafil citrate (VIAGRA) 100 MG tablet Take 1 Tablet by mouth 1 time a day as needed for Erectile Dysfunction (take 1-2 hours before sexual activity). 10 Tablet 3     No current facility-administered medications for this visit.       Patient Active Problem List    Diagnosis Date Noted    JAMEE on CPAP 10/01/2024    Skin tags, multiple acquired 02/27/2024    Shoulder pain, bilateral 04/06/2022    Obesity (BMI 30-39.9)  "04/06/2022    Healthcare maintenance 04/06/2022    Prediabetes 04/09/2019    Hyperlipidemia 04/08/2019    Vertigo 03/12/2019    Intermittent urinary stream 03/12/2019        Objective:   Ht 1.676 m (5' 6\")   Wt 88 kg (194 lb)   BMI 31.31 kg/m²     Physical Exam:  Constitutional: Alert, no distress, well-groomed.  Skin: No rashes in visible areas.  Eye: Round. Conjunctiva clear, lids normal. No icterus.   ENMT: Lips pink without lesions, good dentition, moist mucous membranes. Phonation normal.  Neck: No masses, no thyromegaly. Moves freely without pain.  Respiratory: Unlabored respiratory effort, no cough or audible wheeze  Psych: Alert and oriented x3, normal affect and mood.     Assessment and Plan:   The following treatment plan was discussed:     1. Mixed hyperlipidemia  - Lipid Profile; Future      Assessment & Plan  1. Hyperlipidemia: Elevated.  Total cholesterol 233 on 03/24/2025, triglycerides 324, HDL 36, . Associated with excessive caloric intake and genetic predisposition.  - Advise balanced diet with moderate meat and eggs, focus on chicken and fish.  - Minimize processed foods, oils, fried items, high-sugar foods.  - Reduce overall caloric intake, limit snacking on processed foods.  - Continue Zocor 40 mg daily.  - Monitor cholesterol panel in 4-6 months.  - Consider adjusting medication if cholesterol remains elevated despite adherence.    Follow-up  - Cholesterol panel in 4-6 months.        Follow-up: Return in about 6 months (around 10/2/2025), or if symptoms worsen or fail to improve.         "